# Patient Record
Sex: MALE | Race: WHITE | NOT HISPANIC OR LATINO | URBAN - METROPOLITAN AREA
[De-identification: names, ages, dates, MRNs, and addresses within clinical notes are randomized per-mention and may not be internally consistent; named-entity substitution may affect disease eponyms.]

---

## 2019-05-16 ENCOUNTER — INPATIENT (INPATIENT)
Facility: HOSPITAL | Age: 79
LOS: 6 days | Discharge: HOME | DRG: 68 | End: 2019-05-23
Attending: STUDENT IN AN ORGANIZED HEALTH CARE EDUCATION/TRAINING PROGRAM | Admitting: STUDENT IN AN ORGANIZED HEALTH CARE EDUCATION/TRAINING PROGRAM
Payer: MEDICAID

## 2019-05-16 VITALS
SYSTOLIC BLOOD PRESSURE: 191 MMHG | TEMPERATURE: 98 F | HEIGHT: 64 IN | HEART RATE: 70 BPM | OXYGEN SATURATION: 97 % | RESPIRATION RATE: 16 BRPM | DIASTOLIC BLOOD PRESSURE: 80 MMHG | WEIGHT: 184.97 LBS

## 2019-05-16 LAB
ALBUMIN SERPL ELPH-MCNC: 3.2 G/DL — LOW (ref 3.5–5)
ALP SERPL-CCNC: 77 U/L — SIGNIFICANT CHANGE UP (ref 40–120)
ALT FLD-CCNC: 38 U/L DA — SIGNIFICANT CHANGE UP (ref 10–60)
ANION GAP SERPL CALC-SCNC: 7 MMOL/L — SIGNIFICANT CHANGE UP (ref 5–17)
APAP SERPL-MCNC: <2 UG/ML — LOW (ref 10–30)
APTT BLD: 29.3 SEC — SIGNIFICANT CHANGE UP (ref 27.5–36.3)
AST SERPL-CCNC: 52 U/L — HIGH (ref 10–40)
BASOPHILS # BLD AUTO: 0.04 K/UL — SIGNIFICANT CHANGE UP (ref 0–0.2)
BASOPHILS NFR BLD AUTO: 0.4 % — SIGNIFICANT CHANGE UP (ref 0–2)
BILIRUB SERPL-MCNC: 0.4 MG/DL — SIGNIFICANT CHANGE UP (ref 0.2–1.2)
BUN SERPL-MCNC: 15 MG/DL — SIGNIFICANT CHANGE UP (ref 7–18)
CALCIUM SERPL-MCNC: 8.8 MG/DL — SIGNIFICANT CHANGE UP (ref 8.4–10.5)
CHLORIDE SERPL-SCNC: 109 MMOL/L — HIGH (ref 96–108)
CO2 SERPL-SCNC: 25 MMOL/L — SIGNIFICANT CHANGE UP (ref 22–31)
CREAT SERPL-MCNC: 1.14 MG/DL — SIGNIFICANT CHANGE UP (ref 0.5–1.3)
EOSINOPHIL # BLD AUTO: 0.22 K/UL — SIGNIFICANT CHANGE UP (ref 0–0.5)
EOSINOPHIL NFR BLD AUTO: 1.9 % — SIGNIFICANT CHANGE UP (ref 0–6)
ETHANOL SERPL-MCNC: <3 MG/DL — SIGNIFICANT CHANGE UP (ref 0–10)
GLUCOSE SERPL-MCNC: 129 MG/DL — HIGH (ref 70–99)
HCT VFR BLD CALC: 49.6 % — SIGNIFICANT CHANGE UP (ref 39–50)
HGB BLD-MCNC: 15.6 G/DL — SIGNIFICANT CHANGE UP (ref 13–17)
IMM GRANULOCYTES NFR BLD AUTO: 0.4 % — SIGNIFICANT CHANGE UP (ref 0–1.5)
INR BLD: 1.07 RATIO — SIGNIFICANT CHANGE UP (ref 0.88–1.16)
LYMPHOCYTES # BLD AUTO: 1.17 K/UL — SIGNIFICANT CHANGE UP (ref 1–3.3)
LYMPHOCYTES # BLD AUTO: 10.3 % — LOW (ref 13–44)
MCHC RBC-ENTMCNC: 27 PG — SIGNIFICANT CHANGE UP (ref 27–34)
MCHC RBC-ENTMCNC: 31.5 GM/DL — LOW (ref 32–36)
MCV RBC AUTO: 86 FL — SIGNIFICANT CHANGE UP (ref 80–100)
MONOCYTES # BLD AUTO: 0.7 K/UL — SIGNIFICANT CHANGE UP (ref 0–0.9)
MONOCYTES NFR BLD AUTO: 6.2 % — SIGNIFICANT CHANGE UP (ref 2–14)
NEUTROPHILS # BLD AUTO: 9.21 K/UL — HIGH (ref 1.8–7.4)
NEUTROPHILS NFR BLD AUTO: 80.8 % — HIGH (ref 43–77)
NRBC # BLD: 0 /100 WBCS — SIGNIFICANT CHANGE UP (ref 0–0)
PLATELET # BLD AUTO: 153 K/UL — SIGNIFICANT CHANGE UP (ref 150–400)
POTASSIUM SERPL-MCNC: 5.7 MMOL/L — HIGH (ref 3.5–5.3)
POTASSIUM SERPL-SCNC: 5.7 MMOL/L — HIGH (ref 3.5–5.3)
PROT SERPL-MCNC: 8.1 G/DL — SIGNIFICANT CHANGE UP (ref 6–8.3)
PROTHROM AB SERPL-ACNC: 11.9 SEC — SIGNIFICANT CHANGE UP (ref 10–12.9)
RBC # BLD: 5.77 M/UL — SIGNIFICANT CHANGE UP (ref 4.2–5.8)
RBC # FLD: 13.5 % — SIGNIFICANT CHANGE UP (ref 10.3–14.5)
SALICYLATES SERPL-MCNC: <1.7 MG/DL — LOW (ref 2.8–20)
SODIUM SERPL-SCNC: 141 MMOL/L — SIGNIFICANT CHANGE UP (ref 135–145)
TROPONIN I SERPL-MCNC: <0.015 NG/ML — SIGNIFICANT CHANGE UP (ref 0–0.04)
TSH SERPL-MCNC: 3.36 UU/ML — SIGNIFICANT CHANGE UP (ref 0.34–4.82)
WBC # BLD: 11.38 K/UL — HIGH (ref 3.8–10.5)
WBC # FLD AUTO: 11.38 K/UL — HIGH (ref 3.8–10.5)

## 2019-05-16 PROCEDURE — 73060 X-RAY EXAM OF HUMERUS: CPT | Mod: 26,RT

## 2019-05-16 PROCEDURE — 72170 X-RAY EXAM OF PELVIS: CPT | Mod: 26

## 2019-05-16 PROCEDURE — 73090 X-RAY EXAM OF FOREARM: CPT | Mod: 26,LT

## 2019-05-16 PROCEDURE — 73030 X-RAY EXAM OF SHOULDER: CPT | Mod: 26,RT

## 2019-05-16 PROCEDURE — 71045 X-RAY EXAM CHEST 1 VIEW: CPT | Mod: 26

## 2019-05-16 NOTE — ED PROVIDER NOTE - OBJECTIVE STATEMENT
79 M BIBEMS after fall on street.  Patient arrives with no identification, does not speak english and hard of hearing, unable to use  phone.  Staff member who spoke Armenian able to get patient's  and first name but patient unable to spell last name, cannot give further HPI of events but complaining of R arm pain.  Patient states takes medications for diabetes but does not know names.  Unclear if on blood thinners.

## 2019-05-16 NOTE — ED PROVIDER NOTE - CLINICAL SUMMARY MEDICAL DECISION MAKING FREE TEXT BOX
will check labs, CT scan xray, no apparent distress, will also check with [place] for possible missing person.

## 2019-05-16 NOTE — ED PROVIDER NOTE - PROGRESS NOTE DETAILS
Daughter now at bedside, takes medication for HTN and DM but no insulin, ct and xray negative for acute abrnomality. will admit for syncope vs. fall.

## 2019-05-17 DIAGNOSIS — Z29.9 ENCOUNTER FOR PROPHYLACTIC MEASURES, UNSPECIFIED: ICD-10-CM

## 2019-05-17 DIAGNOSIS — E87.5 HYPERKALEMIA: ICD-10-CM

## 2019-05-17 DIAGNOSIS — R55 SYNCOPE AND COLLAPSE: ICD-10-CM
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
## 2019-05-17 DIAGNOSIS — I10 ESSENTIAL (PRIMARY) HYPERTENSION: ICD-10-CM

## 2019-05-17 DIAGNOSIS — E11.9 TYPE 2 DIABETES MELLITUS WITHOUT COMPLICATIONS: ICD-10-CM

## 2019-05-17 LAB
ANION GAP SERPL CALC-SCNC: 6 MMOL/L — SIGNIFICANT CHANGE UP (ref 5–17)
APPEARANCE UR: CLEAR — SIGNIFICANT CHANGE UP
BASOPHILS # BLD AUTO: 0.03 K/UL — SIGNIFICANT CHANGE UP (ref 0–0.2)
BASOPHILS NFR BLD AUTO: 0.3 % — SIGNIFICANT CHANGE UP (ref 0–2)
BILIRUB UR-MCNC: NEGATIVE — SIGNIFICANT CHANGE UP
BUN SERPL-MCNC: 15 MG/DL — SIGNIFICANT CHANGE UP (ref 7–18)
CALCIUM SERPL-MCNC: 8.6 MG/DL — SIGNIFICANT CHANGE UP (ref 8.4–10.5)
CHLORIDE SERPL-SCNC: 108 MMOL/L — SIGNIFICANT CHANGE UP (ref 96–108)
CHOLEST SERPL-MCNC: 226 MG/DL — HIGH (ref 10–199)
CK MB BLD-MCNC: 1 % — SIGNIFICANT CHANGE UP (ref 0–3.5)
CK MB BLD-MCNC: 1.4 % — SIGNIFICANT CHANGE UP (ref 0–3.5)
CK MB CFR SERPL CALC: 1.2 NG/ML — SIGNIFICANT CHANGE UP (ref 0–3.6)
CK MB CFR SERPL CALC: 1.7 NG/ML — SIGNIFICANT CHANGE UP (ref 0–3.6)
CK SERPL-CCNC: 116 U/L — SIGNIFICANT CHANGE UP (ref 35–232)
CK SERPL-CCNC: 124 U/L — SIGNIFICANT CHANGE UP (ref 35–232)
CO2 SERPL-SCNC: 30 MMOL/L — SIGNIFICANT CHANGE UP (ref 22–31)
COLOR SPEC: YELLOW — SIGNIFICANT CHANGE UP
CREAT SERPL-MCNC: 1.16 MG/DL — SIGNIFICANT CHANGE UP (ref 0.5–1.3)
DIFF PNL FLD: NEGATIVE — SIGNIFICANT CHANGE UP
EOSINOPHIL # BLD AUTO: 0.39 K/UL — SIGNIFICANT CHANGE UP (ref 0–0.5)
EOSINOPHIL NFR BLD AUTO: 4.4 % — SIGNIFICANT CHANGE UP (ref 0–6)
GLUCOSE BLDC GLUCOMTR-MCNC: 114 MG/DL — HIGH (ref 70–99)
GLUCOSE BLDC GLUCOMTR-MCNC: 120 MG/DL — HIGH (ref 70–99)
GLUCOSE BLDC GLUCOMTR-MCNC: 130 MG/DL — HIGH (ref 70–99)
GLUCOSE BLDC GLUCOMTR-MCNC: 132 MG/DL — HIGH (ref 70–99)
GLUCOSE BLDC GLUCOMTR-MCNC: 134 MG/DL — HIGH (ref 70–99)
GLUCOSE SERPL-MCNC: 128 MG/DL — HIGH (ref 70–99)
GLUCOSE UR QL: NEGATIVE — SIGNIFICANT CHANGE UP
HBA1C BLD-MCNC: 8 % — HIGH (ref 4–5.6)
HCT VFR BLD CALC: 46.1 % — SIGNIFICANT CHANGE UP (ref 39–50)
HDLC SERPL-MCNC: 42 MG/DL — SIGNIFICANT CHANGE UP
HGB BLD-MCNC: 14.7 G/DL — SIGNIFICANT CHANGE UP (ref 13–17)
IMM GRANULOCYTES NFR BLD AUTO: 0.2 % — SIGNIFICANT CHANGE UP (ref 0–1.5)
KETONES UR-MCNC: NEGATIVE — SIGNIFICANT CHANGE UP
LEUKOCYTE ESTERASE UR-ACNC: NEGATIVE — SIGNIFICANT CHANGE UP
LIPID PNL WITH DIRECT LDL SERPL: 163 MG/DL — SIGNIFICANT CHANGE UP
LYMPHOCYTES # BLD AUTO: 2.48 K/UL — SIGNIFICANT CHANGE UP (ref 1–3.3)
LYMPHOCYTES # BLD AUTO: 27.7 % — SIGNIFICANT CHANGE UP (ref 13–44)
MAGNESIUM SERPL-MCNC: 2.3 MG/DL — SIGNIFICANT CHANGE UP (ref 1.6–2.6)
MCHC RBC-ENTMCNC: 27.8 PG — SIGNIFICANT CHANGE UP (ref 27–34)
MCHC RBC-ENTMCNC: 31.9 GM/DL — LOW (ref 32–36)
MCV RBC AUTO: 87.3 FL — SIGNIFICANT CHANGE UP (ref 80–100)
MONOCYTES # BLD AUTO: 0.79 K/UL — SIGNIFICANT CHANGE UP (ref 0–0.9)
MONOCYTES NFR BLD AUTO: 8.8 % — SIGNIFICANT CHANGE UP (ref 2–14)
NEUTROPHILS # BLD AUTO: 5.24 K/UL — SIGNIFICANT CHANGE UP (ref 1.8–7.4)
NEUTROPHILS NFR BLD AUTO: 58.6 % — SIGNIFICANT CHANGE UP (ref 43–77)
NITRITE UR-MCNC: NEGATIVE — SIGNIFICANT CHANGE UP
NRBC # BLD: 0 /100 WBCS — SIGNIFICANT CHANGE UP (ref 0–0)
PH UR: 6.5 — SIGNIFICANT CHANGE UP (ref 5–8)
PHOSPHATE SERPL-MCNC: 3.2 MG/DL — SIGNIFICANT CHANGE UP (ref 2.5–4.5)
PLATELET # BLD AUTO: 153 K/UL — SIGNIFICANT CHANGE UP (ref 150–400)
POTASSIUM SERPL-MCNC: 4.2 MMOL/L — SIGNIFICANT CHANGE UP (ref 3.5–5.3)
POTASSIUM SERPL-SCNC: 4.2 MMOL/L — SIGNIFICANT CHANGE UP (ref 3.5–5.3)
PROT UR-MCNC: NEGATIVE — SIGNIFICANT CHANGE UP
RBC # BLD: 5.28 M/UL — SIGNIFICANT CHANGE UP (ref 4.2–5.8)
RBC # FLD: 13.7 % — SIGNIFICANT CHANGE UP (ref 10.3–14.5)
SODIUM SERPL-SCNC: 144 MMOL/L — SIGNIFICANT CHANGE UP (ref 135–145)
SP GR SPEC: 1.01 — SIGNIFICANT CHANGE UP (ref 1.01–1.02)
TOTAL CHOLESTEROL/HDL RATIO MEASUREMENT: 5.4 RATIO — SIGNIFICANT CHANGE UP (ref 3.4–9.6)
TRIGL SERPL-MCNC: 106 MG/DL — SIGNIFICANT CHANGE UP (ref 10–149)
TROPONIN I SERPL-MCNC: <0.015 NG/ML — SIGNIFICANT CHANGE UP (ref 0–0.04)
TROPONIN I SERPL-MCNC: <0.015 NG/ML — SIGNIFICANT CHANGE UP (ref 0–0.04)
TSH SERPL-MCNC: 3.11 UU/ML — SIGNIFICANT CHANGE UP (ref 0.34–4.82)
UROBILINOGEN FLD QL: NEGATIVE — SIGNIFICANT CHANGE UP
VIT B12 SERPL-MCNC: 339 PG/ML — SIGNIFICANT CHANGE UP (ref 232–1245)
WBC # BLD: 8.95 K/UL — SIGNIFICANT CHANGE UP (ref 3.8–10.5)
WBC # FLD AUTO: 8.95 K/UL — SIGNIFICANT CHANGE UP (ref 3.8–10.5)

## 2019-05-17 PROCEDURE — 99285 EMERGENCY DEPT VISIT HI MDM: CPT

## 2019-05-17 PROCEDURE — 72125 CT NECK SPINE W/O DYE: CPT | Mod: 26

## 2019-05-17 PROCEDURE — 71045 X-RAY EXAM CHEST 1 VIEW: CPT | Mod: 26

## 2019-05-17 PROCEDURE — 70450 CT HEAD/BRAIN W/O DYE: CPT | Mod: 26

## 2019-05-17 PROCEDURE — 99223 1ST HOSP IP/OBS HIGH 75: CPT

## 2019-05-17 RX ORDER — METFORMIN HYDROCHLORIDE 850 MG/1
1 TABLET ORAL
Qty: 0 | Refills: 0 | DISCHARGE

## 2019-05-17 RX ORDER — DEXTROSE 50 % IN WATER 50 %
25 SYRINGE (ML) INTRAVENOUS ONCE
Refills: 0 | Status: DISCONTINUED | OUTPATIENT
Start: 2019-05-17 | End: 2019-05-17

## 2019-05-17 RX ORDER — LOSARTAN POTASSIUM 100 MG/1
1 TABLET, FILM COATED ORAL
Qty: 0 | Refills: 0 | DISCHARGE

## 2019-05-17 RX ORDER — SODIUM CHLORIDE 9 MG/ML
1000 INJECTION, SOLUTION INTRAVENOUS
Refills: 0 | Status: DISCONTINUED | OUTPATIENT
Start: 2019-05-17 | End: 2019-05-17

## 2019-05-17 RX ORDER — LOSARTAN POTASSIUM 100 MG/1
50 TABLET, FILM COATED ORAL DAILY
Refills: 0 | Status: DISCONTINUED | OUTPATIENT
Start: 2019-05-17 | End: 2019-05-23

## 2019-05-17 RX ORDER — ACETAMINOPHEN 500 MG
650 TABLET ORAL EVERY 6 HOURS
Refills: 0 | Status: DISCONTINUED | OUTPATIENT
Start: 2019-05-17 | End: 2019-05-23

## 2019-05-17 RX ORDER — GLUCAGON INJECTION, SOLUTION 0.5 MG/.1ML
1 INJECTION, SOLUTION SUBCUTANEOUS ONCE
Refills: 0 | Status: DISCONTINUED | OUTPATIENT
Start: 2019-05-17 | End: 2019-05-17

## 2019-05-17 RX ORDER — ENOXAPARIN SODIUM 100 MG/ML
40 INJECTION SUBCUTANEOUS DAILY
Refills: 0 | Status: DISCONTINUED | OUTPATIENT
Start: 2019-05-17 | End: 2019-05-23

## 2019-05-17 RX ORDER — ASPIRIN/CALCIUM CARB/MAGNESIUM 324 MG
81 TABLET ORAL DAILY
Refills: 0 | Status: DISCONTINUED | OUTPATIENT
Start: 2019-05-17 | End: 2019-05-23

## 2019-05-17 RX ORDER — DEXTROSE 50 % IN WATER 50 %
12.5 SYRINGE (ML) INTRAVENOUS ONCE
Refills: 0 | Status: DISCONTINUED | OUTPATIENT
Start: 2019-05-17 | End: 2019-05-17

## 2019-05-17 RX ORDER — INSULIN LISPRO 100/ML
VIAL (ML) SUBCUTANEOUS
Refills: 0 | Status: DISCONTINUED | OUTPATIENT
Start: 2019-05-17 | End: 2019-05-23

## 2019-05-17 RX ORDER — ATORVASTATIN CALCIUM 80 MG/1
40 TABLET, FILM COATED ORAL AT BEDTIME
Refills: 0 | Status: DISCONTINUED | OUTPATIENT
Start: 2019-05-17 | End: 2019-05-23

## 2019-05-17 RX ORDER — METOPROLOL TARTRATE 50 MG
25 TABLET ORAL
Refills: 0 | Status: DISCONTINUED | OUTPATIENT
Start: 2019-05-17 | End: 2019-05-23

## 2019-05-17 RX ORDER — DEXTROSE 50 % IN WATER 50 %
15 SYRINGE (ML) INTRAVENOUS ONCE
Refills: 0 | Status: DISCONTINUED | OUTPATIENT
Start: 2019-05-17 | End: 2019-05-17

## 2019-05-17 RX ADMIN — ATORVASTATIN CALCIUM 40 MILLIGRAM(S): 80 TABLET, FILM COATED ORAL at 22:30

## 2019-05-17 RX ADMIN — Medication 25 MILLIGRAM(S): at 18:15

## 2019-05-17 RX ADMIN — ENOXAPARIN SODIUM 40 MILLIGRAM(S): 100 INJECTION SUBCUTANEOUS at 12:17

## 2019-05-17 RX ADMIN — Medication 25 MILLIGRAM(S): at 06:20

## 2019-05-17 RX ADMIN — LOSARTAN POTASSIUM 50 MILLIGRAM(S): 100 TABLET, FILM COATED ORAL at 06:20

## 2019-05-17 RX ADMIN — Medication 81 MILLIGRAM(S): at 18:18

## 2019-05-17 NOTE — H&P ADULT - ATTENDING COMMENTS
79 year old man with PMH of Dementia who apparently wandered off while on his daily walk brought in on account of a fall -unwitnessed and c/o of right shoulder pain. HE was brought to the hospital by the PD where his family reconnected.  Of note, standing hx at home or intermittent dizziness /pre-syncope and has had to be caught before he fell multiple times per daughter. He has never been worked up for this.    Vital Signs Last 24 Hrs  T(C): 37.1 (17 May 2019 01:12), Max: 37.1 (17 May 2019 01:12)  T(F): 98.8 (17 May 2019 01:12), Max: 98.8 (17 May 2019 01:12)  HR: 66 (17 May 2019 01:12) (66 - 70)  BP: 157/71 (17 May 2019 01:12) (157/71 - 191/80)  RR: 16 (17 May 2019 01:12) (16 - 16)  SpO2: 97% (17 May 2019 01:12) (97% - 97%)    Elderly man, awake, [place] speaking with daughter interpreting, NAD AAO to self and place at least  No nystagmus  CTA B/L  RRR S1S2 only  Soft NT ND BS +  No pedal edema  No focal deficits    Labs                        15.6   11.38 )-----------( 153      ( 16 May 2019 22:00 )             49.6     05-16    141  |  109<H>  |  15  ----------------------------<  129<H>  5.7<H>   |  25  |  1.14    Ca    8.8      16 May 2019 22:00    TPro  8.1  /  Alb  3.2<L>  /  TBili  0.4  /  DBili  x   /  AST  52<H>  /  ALT  38  /  AlkPhos  77  05-16    CARDIAC MARKERS ( 16 May 2019 22:00 )  <0.015 ng/mL / x     / x     / x     / x        CT head/c-spine - no acute findings    Right shoulder/right humeral and forearm x rays  No acute fracture  periosteal elevation in humeral x ray. Await final report.    Impression  - Mechanical fall vs syncope   - Mild hyperkalemia     No evidence of fracture as above  Would r/o CV ( and neuro) causes of possible syncope  ADMIT to tele  Serial trop  EKG  ECHO  Carotid doppler scan  Monitor BP  Fall precaution  Social work consult - daughter wants more [facility] hours  Repeat chemistry after correcting K+

## 2019-05-17 NOTE — H&P ADULT - ASSESSMENT
79 years old male who walks with cane, lives with daughter, PMHX of DM, HTN was brought in my EMS after he fell on street. Normally, he walks with cane per daughter but went out without it and fell. Patient has multiple falls recently. Pt is complaining of pain in Right arm since fall. Pain is 7/10 in intensity, located in right arm, aggravated y movement. Patient is a poor historian and does not elaborate the circumstances under which he fell. It was an unwitnessed fall.

## 2019-05-17 NOTE — H&P ADULT - PROBLEM SELECTOR PLAN 2
Pt presented with S/P fall which was unwitnessed.  CT head and cervical spine is negative for any acute changes.  Pt complains of right arm pain.  X-rays are negative for any acute findings.  pain management.  PT consult.  Fall precautions.

## 2019-05-17 NOTE — H&P ADULT - NSHPPHYSICALEXAM_GEN_ALL_CORE
Vital Signs Last 24 Hrs  T(C): 36.3 (17 May 2019 05:22), Max: 37.1 (17 May 2019 01:12)  T(F): 97.4 (17 May 2019 05:22), Max: 98.8 (17 May 2019 01:12)  HR: 75 (17 May 2019 05:22) (66 - 75)  BP: 154/62 (17 May 2019 05:22) (154/62 - 191/80)  BP(mean): --  RR: 18 (17 May 2019 05:22) (16 - 18)  SpO2: 91% (17 May 2019 05:22) (91% - 97%)

## 2019-05-17 NOTE — ED ADULT NURSE REASSESSMENT NOTE - NS ED NURSE REASSESS COMMENT FT1
Pt reassessed, observed laying in bed, breathing room air, in no respiratory distress at time of assessment. Pt is A&O x2-3, able to make needs known in [place]an, denies any distress/discomfort at this time. Daughter & son at bedside. No meds administered at this time. Skin intact, right hand #20Ga in place, pt ambulates with assistance. Admitted to [facility], on wall monitor, sinus rhythm noted, HR 78 at this time. Report given to LINDA Frances for [address] [place] [address]. Pt transported on stretcher and monitor to floor alongside family.

## 2019-05-17 NOTE — H&P ADULT - PROBLEM SELECTOR PLAN 1
pt presented with s/p fall.  admitted with R/O syncope cardiogenic vs neurological causes.  Admitted to telemetry.  T1 is neg, F/U T2 and T3.  f/u Echo  f /u carotid dopplar.  CT head is negative for any acute change.

## 2019-05-17 NOTE — ED ADULT NURSE NOTE - OBJECTIVE STATEMENT
BIBA, pt fell on the street. Per EMS it was witnessed. On arrival, pt appears lethargic, not able to tell his name or environment.

## 2019-05-17 NOTE — ED ADULT NURSE NOTE - NSIMPLEMENTINTERV_GEN_ALL_ED
Implemented All Fall with Harm Risk Interventions:  [place] to call system. Call bell, personal items and telephone within reach. Instruct patient to call for assistance. Room bathroom lighting operational. Non-slip footwear when patient is off stretcher. Physically safe environment: no spills, clutter or unnecessary equipment. Stretcher in lowest position, wheels locked, appropriate side rails in place. Provide visual cue, wrist band, yellow gown, etc. Monitor gait and stability. Monitor for mental status changes and reorient to person, place, and time. Review medications for side effects contributing to fall risk. Reinforce activity limits and safety measures with patient and family. Provide visual clues: red socks.

## 2019-05-17 NOTE — H&P ADULT - HISTORY OF PRESENT ILLNESS
79 years old male who walks with cane, lives with daughter, PMHX of DM, HTN was brought in my EMS after he fell on street. Normally, he walks with cane per daughter but went out without it and fell. Patient has multiple falls recently. Pt is complaining of pain in Right arm since fall. Pain is 7/10 in intensity, located in right arm, aggravated y movement. Patient is a poor historian and does not elaborate the circumstances under which he fell. It was an unwitnessed fall.   pt denies any chest pain, palpitations, shortness of breath, nausea, vomiting, abdominal pain or any other symptom.

## 2019-05-17 NOTE — H&P ADULT - PROBLEM SELECTOR PLAN 3
pt has mild hyperkalemia on presentation with serum potassium of 5.7.  Not on any diuretic, kidney function is grossly normal.  F/u BMP in am

## 2019-05-17 NOTE — H&P ADULT - PROBLEM SELECTOR PLAN 5
Pt has history of DM for which he takes metformin 500 mg twice daily at home.  insulin sliding scale while in house.  F/U Hba1c

## 2019-05-17 NOTE — H&P ADULT - PROBLEM SELECTOR PLAN 4
pt has history of hypertension.  Blood pressure was elevated on presentation.  resumed Home dose of losartan and started metoprolol per ACS protocol.  monitor BP.  DASH tlc diet

## 2019-05-18 LAB
ANION GAP SERPL CALC-SCNC: 6 MMOL/L — SIGNIFICANT CHANGE UP (ref 5–17)
BASOPHILS # BLD AUTO: 0.04 K/UL — SIGNIFICANT CHANGE UP (ref 0–0.2)
BASOPHILS NFR BLD AUTO: 0.5 % — SIGNIFICANT CHANGE UP (ref 0–2)
BUN SERPL-MCNC: 26 MG/DL — HIGH (ref 7–18)
CALCIUM SERPL-MCNC: 8.9 MG/DL — SIGNIFICANT CHANGE UP (ref 8.4–10.5)
CHLORIDE SERPL-SCNC: 109 MMOL/L — HIGH (ref 96–108)
CO2 SERPL-SCNC: 30 MMOL/L — SIGNIFICANT CHANGE UP (ref 22–31)
CREAT SERPL-MCNC: 1.16 MG/DL — SIGNIFICANT CHANGE UP (ref 0.5–1.3)
CULTURE RESULTS: SIGNIFICANT CHANGE UP
EOSINOPHIL # BLD AUTO: 0.62 K/UL — HIGH (ref 0–0.5)
EOSINOPHIL NFR BLD AUTO: 8.1 % — HIGH (ref 0–6)
GLUCOSE BLDC GLUCOMTR-MCNC: 106 MG/DL — HIGH (ref 70–99)
GLUCOSE BLDC GLUCOMTR-MCNC: 139 MG/DL — HIGH (ref 70–99)
GLUCOSE BLDC GLUCOMTR-MCNC: 149 MG/DL — HIGH (ref 70–99)
GLUCOSE BLDC GLUCOMTR-MCNC: 168 MG/DL — HIGH (ref 70–99)
GLUCOSE SERPL-MCNC: 133 MG/DL — HIGH (ref 70–99)
HCT VFR BLD CALC: 49.8 % — SIGNIFICANT CHANGE UP (ref 39–50)
HGB BLD-MCNC: 15.8 G/DL — SIGNIFICANT CHANGE UP (ref 13–17)
IMM GRANULOCYTES NFR BLD AUTO: 0.3 % — SIGNIFICANT CHANGE UP (ref 0–1.5)
LYMPHOCYTES # BLD AUTO: 2.21 K/UL — SIGNIFICANT CHANGE UP (ref 1–3.3)
LYMPHOCYTES # BLD AUTO: 29 % — SIGNIFICANT CHANGE UP (ref 13–44)
MCHC RBC-ENTMCNC: 27.2 PG — SIGNIFICANT CHANGE UP (ref 27–34)
MCHC RBC-ENTMCNC: 31.7 GM/DL — LOW (ref 32–36)
MCV RBC AUTO: 85.9 FL — SIGNIFICANT CHANGE UP (ref 80–100)
MONOCYTES # BLD AUTO: 0.48 K/UL — SIGNIFICANT CHANGE UP (ref 0–0.9)
MONOCYTES NFR BLD AUTO: 6.3 % — SIGNIFICANT CHANGE UP (ref 2–14)
NEUTROPHILS # BLD AUTO: 4.25 K/UL — SIGNIFICANT CHANGE UP (ref 1.8–7.4)
NEUTROPHILS NFR BLD AUTO: 55.8 % — SIGNIFICANT CHANGE UP (ref 43–77)
NRBC # BLD: 0 /100 WBCS — SIGNIFICANT CHANGE UP (ref 0–0)
PLATELET # BLD AUTO: 167 K/UL — SIGNIFICANT CHANGE UP (ref 150–400)
POTASSIUM SERPL-MCNC: 4.4 MMOL/L — SIGNIFICANT CHANGE UP (ref 3.5–5.3)
POTASSIUM SERPL-SCNC: 4.4 MMOL/L — SIGNIFICANT CHANGE UP (ref 3.5–5.3)
RBC # BLD: 5.8 M/UL — SIGNIFICANT CHANGE UP (ref 4.2–5.8)
RBC # FLD: 13.9 % — SIGNIFICANT CHANGE UP (ref 10.3–14.5)
SODIUM SERPL-SCNC: 145 MMOL/L — SIGNIFICANT CHANGE UP (ref 135–145)
SPECIMEN SOURCE: SIGNIFICANT CHANGE UP
WBC # BLD: 7.62 K/UL — SIGNIFICANT CHANGE UP (ref 3.8–10.5)
WBC # FLD AUTO: 7.62 K/UL — SIGNIFICANT CHANGE UP (ref 3.8–10.5)

## 2019-05-18 PROCEDURE — 99233 SBSQ HOSP IP/OBS HIGH 50: CPT

## 2019-05-18 RX ADMIN — LOSARTAN POTASSIUM 50 MILLIGRAM(S): 100 TABLET, FILM COATED ORAL at 06:39

## 2019-05-18 RX ADMIN — Medication 25 MILLIGRAM(S): at 06:39

## 2019-05-18 RX ADMIN — Medication 25 MILLIGRAM(S): at 17:30

## 2019-05-18 RX ADMIN — Medication 81 MILLIGRAM(S): at 13:16

## 2019-05-18 RX ADMIN — Medication 1: at 12:15

## 2019-05-18 RX ADMIN — ENOXAPARIN SODIUM 40 MILLIGRAM(S): 100 INJECTION SUBCUTANEOUS at 13:11

## 2019-05-18 RX ADMIN — ATORVASTATIN CALCIUM 40 MILLIGRAM(S): 80 TABLET, FILM COATED ORAL at 22:17

## 2019-05-18 NOTE — PHYSICAL THERAPY INITIAL EVALUATION ADULT - LIVES WITH, PROFILE
Unable to obtain living situation info from patient. Tried to use [place]  but patient [place]. Called daughter and went to voicemail

## 2019-05-18 NOTE — PHYSICAL THERAPY INITIAL EVALUATION ADULT - ADDITIONAL COMMENTS
Unable to obtain living situation info from patient. Tried to use [place]  but patient [place]. Called daughter and went to voicemail. As per patient H&P, patient lives with daughter and walks with cane.

## 2019-05-18 NOTE — PROGRESS NOTE ADULT - PROBLEM SELECTOR PLAN 1
s/p unwitnessed fall  tele monitoring  cardiac enyzmes negative  f/u carotid doppler  echo shows pEF with G1DD and mod pulm HTN  PT following  social work following as family wants extended home care hours s/p unwitnessed fall  tele monitoring  cardiac enyzmes negative  f/u carotid doppler  echo shows pEF with G1DD and mod pulm HTN  PT following: home with PT  social work following as family wants extended home care hours

## 2019-05-18 NOTE — PHYSICAL THERAPY INITIAL EVALUATION ADULT - DIAGNOSIS, PT EVAL
Patient presents with slightly impaired balance during ambulation, secondary to fatigue and BLE general weakness.

## 2019-05-18 NOTE — PROGRESS NOTE ADULT - SUBJECTIVE AND OBJECTIVE BOX
PGY1 Note discussed with supervising resident and primary attending.    Patient is a 79y old  Male who presents with a chief complaint of s/p fall (17 May 2019 04:27)      INTERVAL HPI/OVERNIGHT EVENTS:    MEDICATIONS  (STANDING):  aspirin  chewable 81 milliGRAM(s) Oral daily  atorvastatin 40 milliGRAM(s) Oral at bedtime  enoxaparin Injectable 40 milliGRAM(s) SubCutaneous daily  insulin lispro (HumaLOG) corrective regimen sliding scale   SubCutaneous Before meals and at bedtime  losartan 50 milliGRAM(s) Oral daily  metoprolol tartrate 25 milliGRAM(s) Oral two times a day    MEDICATIONS  (PRN):  acetaminophen   Tablet .. 650 milliGRAM(s) Oral every 6 hours PRN Temp greater or equal to 38C (100.4F), Mild Pain (1 - 3), Moderate Pain (4 - 6)      Allergies    Allergy Status Unknown    Intolerances        REVIEW OF SYSTEMS:  CONSTITUTIONAL: No fever, weight loss, or fatigue  RESPIRATORY: No cough, wheezing, chills or hemoptysis; No shortness of breath  CARDIOVASCULAR: No chest pain, palpitations, dizziness, or leg swelling  GASTROINTESTINAL: No abdominal or epigastric pain. No nausea, vomiting, or hematemesis; No diarrhea or constipation. No melena or hematochezia.  NEUROLOGICAL: No headaches, memory loss, loss of strength, numbness, or tremors  SKIN: No itching, burning, rashes, or lesions     Vital Signs Last 24 Hrs  T(C): 36.4 (18 May 2019 11:11), Max: 37.1 (17 May 2019 19:09)  T(F): 97.5 (18 May 2019 11:11), Max: 98.7 (17 May 2019 19:09)  HR: 58 (18 May 2019 11:11) (56 - 76)  BP: 145/58 (18 May 2019 11:11) (132/58 - 150/60)  BP(mean): --  RR: 18 (18 May 2019 11:11) (18 - 18)  SpO2: 95% (18 May 2019 11:11) (93% - 97%)    PHYSICAL EXAM:  GENERAL: NAD  HEAD:  Atraumatic, Normocephalic  EYES: EOMI, PERRLA, conjunctiva and sclera clear  NECK: Supple, No JVD, Normal thyroid  CHEST/LUNG: Clear to percussion bilaterally; No rales, rhonchi, wheezing, or rubs  HEART: Regular rate and rhythm; No murmurs, rubs, or gallops  ABDOMEN: Soft, Nontender, Nondistended; Bowel sounds present  NERVOUS SYSTEM:  Good concentration  EXTREMITIES:  2+ Peripheral Pulses, No clubbing, cyanosis, or edema    LABS:                        15.8   7.62  )-----------( 167      ( 18 May 2019 06:58 )             49.8     05-18    145  |  109<H>  |  26<H>  ----------------------------<  133<H>  4.4   |  30  |  1.16    Ca    8.9      18 May 2019 06:58  Phos  3.2     05-  Mg     2.3     -    TPro  8.1  /  Alb  3.2<L>  /  TBili  0.4  /  DBili  x   /  AST  52<H>  /  ALT  38  /  AlkPhos  77  05-16    PT/INR - ( 16 May 2019 22:00 )   PT: 11.9 sec;   INR: 1.07 ratio         PTT - ( 16 May 2019 22:00 )  PTT:29.3 sec  Urinalysis Basic - ( 17 May 2019 12:36 )    Color: Yellow / Appearance: Clear / S.015 / pH: x  Gluc: x / Ketone: Negative  / Bili: Negative / Urobili: Negative   Blood: x / Protein: Negative / Nitrite: Negative   Leuk Esterase: Negative / RBC: x / WBC x   Sq Epi: x / Non Sq Epi: x / Bacteria: x      CAPILLARY BLOOD GLUCOSE      POCT Blood Glucose.: 139 mg/dL (18 May 2019 08:08)  POCT Blood Glucose.: 114 mg/dL (17 May 2019 22:10)  POCT Blood Glucose.: 120 mg/dL (17 May 2019 21:48)  POCT Blood Glucose.: 132 mg/dL (17 May 2019 17:04)  POCT Blood Glucose.: 130 mg/dL (17 May 2019 12:02)      RADIOLOGY & ADDITIONAL TESTS:    Imaging Personally Reviewed:  [X ] YES  [ ] NO    Consultant(s) Notes Reviewed:  [X ] YES  [ ] NO

## 2019-05-19 LAB
ANION GAP SERPL CALC-SCNC: 5 MMOL/L — SIGNIFICANT CHANGE UP (ref 5–17)
BASOPHILS # BLD AUTO: 0.04 K/UL — SIGNIFICANT CHANGE UP (ref 0–0.2)
BASOPHILS NFR BLD AUTO: 0.6 % — SIGNIFICANT CHANGE UP (ref 0–2)
BUN SERPL-MCNC: 28 MG/DL — HIGH (ref 7–18)
CALCIUM SERPL-MCNC: 8.7 MG/DL — SIGNIFICANT CHANGE UP (ref 8.4–10.5)
CHLORIDE SERPL-SCNC: 105 MMOL/L — SIGNIFICANT CHANGE UP (ref 96–108)
CO2 SERPL-SCNC: 31 MMOL/L — SIGNIFICANT CHANGE UP (ref 22–31)
CREAT SERPL-MCNC: 1.2 MG/DL — SIGNIFICANT CHANGE UP (ref 0.5–1.3)
EOSINOPHIL # BLD AUTO: 0.71 K/UL — HIGH (ref 0–0.5)
EOSINOPHIL NFR BLD AUTO: 10.2 % — HIGH (ref 0–6)
GLUCOSE BLDC GLUCOMTR-MCNC: 206 MG/DL — HIGH (ref 70–99)
GLUCOSE BLDC GLUCOMTR-MCNC: 82 MG/DL — SIGNIFICANT CHANGE UP (ref 70–99)
GLUCOSE BLDC GLUCOMTR-MCNC: 97 MG/DL — SIGNIFICANT CHANGE UP (ref 70–99)
GLUCOSE BLDC GLUCOMTR-MCNC: 97 MG/DL — SIGNIFICANT CHANGE UP (ref 70–99)
GLUCOSE SERPL-MCNC: 104 MG/DL — HIGH (ref 70–99)
HCT VFR BLD CALC: 47.7 % — SIGNIFICANT CHANGE UP (ref 39–50)
HGB BLD-MCNC: 14.9 G/DL — SIGNIFICANT CHANGE UP (ref 13–17)
IMM GRANULOCYTES NFR BLD AUTO: 0.3 % — SIGNIFICANT CHANGE UP (ref 0–1.5)
LYMPHOCYTES # BLD AUTO: 2.23 K/UL — SIGNIFICANT CHANGE UP (ref 1–3.3)
LYMPHOCYTES # BLD AUTO: 31.9 % — SIGNIFICANT CHANGE UP (ref 13–44)
MCHC RBC-ENTMCNC: 27.1 PG — SIGNIFICANT CHANGE UP (ref 27–34)
MCHC RBC-ENTMCNC: 31.2 GM/DL — LOW (ref 32–36)
MCV RBC AUTO: 86.9 FL — SIGNIFICANT CHANGE UP (ref 80–100)
MONOCYTES # BLD AUTO: 0.54 K/UL — SIGNIFICANT CHANGE UP (ref 0–0.9)
MONOCYTES NFR BLD AUTO: 7.7 % — SIGNIFICANT CHANGE UP (ref 2–14)
NEUTROPHILS # BLD AUTO: 3.45 K/UL — SIGNIFICANT CHANGE UP (ref 1.8–7.4)
NEUTROPHILS NFR BLD AUTO: 49.3 % — SIGNIFICANT CHANGE UP (ref 43–77)
NRBC # BLD: 0 /100 WBCS — SIGNIFICANT CHANGE UP (ref 0–0)
PLATELET # BLD AUTO: 146 K/UL — LOW (ref 150–400)
POTASSIUM SERPL-MCNC: 4.1 MMOL/L — SIGNIFICANT CHANGE UP (ref 3.5–5.3)
POTASSIUM SERPL-SCNC: 4.1 MMOL/L — SIGNIFICANT CHANGE UP (ref 3.5–5.3)
RBC # BLD: 5.49 M/UL — SIGNIFICANT CHANGE UP (ref 4.2–5.8)
RBC # FLD: 13.6 % — SIGNIFICANT CHANGE UP (ref 10.3–14.5)
SODIUM SERPL-SCNC: 141 MMOL/L — SIGNIFICANT CHANGE UP (ref 135–145)
WBC # BLD: 6.99 K/UL — SIGNIFICANT CHANGE UP (ref 3.8–10.5)
WBC # FLD AUTO: 6.99 K/UL — SIGNIFICANT CHANGE UP (ref 3.8–10.5)

## 2019-05-19 PROCEDURE — 99232 SBSQ HOSP IP/OBS MODERATE 35: CPT

## 2019-05-19 RX ADMIN — ATORVASTATIN CALCIUM 40 MILLIGRAM(S): 80 TABLET, FILM COATED ORAL at 22:02

## 2019-05-19 RX ADMIN — Medication 81 MILLIGRAM(S): at 12:18

## 2019-05-19 RX ADMIN — Medication 2: at 12:18

## 2019-05-19 RX ADMIN — Medication 25 MILLIGRAM(S): at 17:18

## 2019-05-19 RX ADMIN — ENOXAPARIN SODIUM 40 MILLIGRAM(S): 100 INJECTION SUBCUTANEOUS at 12:18

## 2019-05-19 NOTE — PROGRESS NOTE ADULT - SUBJECTIVE AND OBJECTIVE BOX
Patient is a 79y old  Male who presents with a chief complaint of s/p fall (18 May 2019 11:12)      HPI:  79 years old male who walks with cane, lives with daughter, PMHX of DM, HTN was brought in my EMS after he fell on street. Normally, he walks with cane per daughter but went out without it and fell. Patient has multiple falls recently. Pt is complaining of pain in Right arm since fall. Pain is 7/10 in intensity, located in right arm, aggravated y movement. Patient is a poor historian and does not elaborate the circumstances under which he fell. It was an unwitnessed fall.   pt denies any chest pain, palpitations, shortness of breath, nausea, vomiting, abdominal pain or any other symptom. (17 May 2019 04:27)    OPPQQRST    REVIEW OF SYSTEMS  General: no lethargy	  Skin/Breast: no rash  Ophthalmologic: no blurry vision  ENMT:	no sore throat, no ear pain  Respiratory and Thorax:	no sob, no cough, wheezing  Cardiovascular:	no chest pain, no palpitations, no lower extremity swelling  Gastrointestinal:	no nausea, no vomiting,   Genitourinary: no dysuria, no frequency	  Musculoskeletal:	 no muscle pain  Neurological: no weakness  Psychiatric: no anxiety	  Hematology/Lymphatics:	 no bruising  Endocrine: no increased thirst, no change in appetite  PAST MEDICAL & SURGICAL HISTORY:  Diabetes  Hypertension    MEDICATIONS  (STANDING):  aspirin  chewable 81 milliGRAM(s) Oral daily  atorvastatin 40 milliGRAM(s) Oral at bedtime  enoxaparin Injectable 40 milliGRAM(s) SubCutaneous daily  insulin lispro (HumaLOG) corrective regimen sliding scale   SubCutaneous Before meals and at bedtime  losartan 50 milliGRAM(s) Oral daily  metoprolol tartrate 25 milliGRAM(s) Oral two times a day    MEDICATIONS  (PRN):  acetaminophen   Tablet .. 650 milliGRAM(s) Oral every 6 hours PRN Temp greater or equal to 38C (100.4F), Mild Pain (1 - 3), Moderate Pain (4 - 6)      Social History:  etoh  tobacco   drug use    EXAM:  Vital Signs Last 24 Hrs  T(C): 37 (19 May 2019 15:37), Max: 37 (19 May 2019 15:37)  T(F): 98.6 (19 May 2019 15:37), Max: 98.6 (19 May 2019 15:37)  HR: 69 (19 May 2019 15:37) (53 - 69)  BP: 137/58 (19 May 2019 15:37) (134/66 - 154/51)  BP(mean): --  RR: 18 (19 May 2019 15:37) (18 - 18)  SpO2: 96% (19 May 2019 15:37) (94% - 96%)      PHYSICAL EXAM:  Constitutional: awake sleeping in stretcher chair.   Eyes: eomi, perrla.  ENMT: patent nares, moist mucus membranes  Neck: supple  Breasts: deferred  Back: non tender. no scoliosis.   Respiratory: bilaterally clear to auscultation, no wheezing, no rhonchi, no crackles, no decreased air entry  Cardiovascular: s1s2, rrr, no murmurs.   Gastrointestinal: soft, non tender, +bowel sounds, no rebound, no guarding.   Genitourinary: deferred  Rectal: deferred   Extremities: no edema.   Vascular:   Neurological: alert and oriented to person, date and place.   Skin: intact no rash, warm to touch.   Lymph Nodes:  Musculoskeletal: moves all 4 extremities  Psychiatric: no homicidal, suicidal ideation. appropriate affect.   Heme/Lymph:   LABS:                              14.9   6.99  )-----------( 146      ( 19 May 2019 06:21 )             47.7     05-19    141  |  105  |  28<H>  ----------------------------<  104<H>  4.1   |  31  |  1.20    Ca    8.7      19 May 2019 06:21              Chart reviewed.   Labs reviewed.  Imaging reviewed.   Plan discussed with consultants. Patient is a 79y old  Male who presents with a chief complaint of s/p fall (18 May 2019 11:12)    HPI:  79 years old male who walks with cane, lives with daughter, PMHX of DM, HTN was brought in my EMS after he fell on street. Normally, he walks with cane per daughter but went out without it and fell. Patient has multiple falls recently. Pt is complaining of pain in Right arm since fall. Pain is 7/10 in intensity, located in right arm, aggravated y movement. Patient is a poor historian and does not elaborate the circumstances under which he fell. It was an unwitnessed fall.   pt denies any chest pain, palpitations, shortness of breath, nausea, vomiting, abdominal pain or any other symptom. (17 May 2019 04:27)    Today:   No events as per staff.   Pt reading, waves his hand away on being examined.     PAST MEDICAL & SURGICAL HISTORY:  Diabetes  Hypertension    MEDICATIONS  (STANDING):  aspirin  chewable 81 milliGRAM(s) Oral daily  atorvastatin 40 milliGRAM(s) Oral at bedtime  enoxaparin Injectable 40 milliGRAM(s) SubCutaneous daily  insulin lispro (HumaLOG) corrective regimen sliding scale   SubCutaneous Before meals and at bedtime  losartan 50 milliGRAM(s) Oral daily  metoprolol tartrate 25 milliGRAM(s) Oral two times a day    MEDICATIONS  (PRN):  acetaminophen   Tablet .. 650 milliGRAM(s) Oral every 6 hours PRN Temp greater or equal to 38C (100.4F), Mild Pain (1 - 3), Moderate Pain (4 - 6)    EXAM:  Vital Signs Last 24 Hrs  T(C): 37 (19 May 2019 15:37), Max: 37 (19 May 2019 15:37)  T(F): 98.6 (19 May 2019 15:37), Max: 98.6 (19 May 2019 15:37)  HR: 69 (19 May 2019 15:37) (53 - 69)  BP: 137/58 (19 May 2019 15:37) (134/66 - 154/51)  BP(mean): --  RR: 18 (19 May 2019 15:37) (18 - 18)  SpO2: 96% (19 May 2019 15:37) (94% - 96%)    PHYSICAL EXAM:  Constitutional: awake sitting in bed reading. Pt does not want to be examined. Pt waving his hand when I attempt to interact with him.   Psychiatric: calm affect.     LABS:                      14.9   6.99  )-----------( 146      ( 19 May 2019 06:21 )             47.7     05-19  141  |  105  |  28<H>  ----------------------------<  104<H>  4.1   |  31  |  1.20  Ca    8.7      19 May 2019 06:21    Chart reviewed.   Labs reviewed.  Imaging reviewed.   Plan discussed with consultants.

## 2019-05-20 LAB
ANION GAP SERPL CALC-SCNC: 10 MMOL/L — SIGNIFICANT CHANGE UP (ref 5–17)
BASOPHILS # BLD AUTO: 0.04 K/UL — SIGNIFICANT CHANGE UP (ref 0–0.2)
BASOPHILS NFR BLD AUTO: 0.6 % — SIGNIFICANT CHANGE UP (ref 0–2)
BUN SERPL-MCNC: 24 MG/DL — HIGH (ref 7–18)
CALCIUM SERPL-MCNC: 8.7 MG/DL — SIGNIFICANT CHANGE UP (ref 8.4–10.5)
CHLORIDE SERPL-SCNC: 104 MMOL/L — SIGNIFICANT CHANGE UP (ref 96–108)
CO2 SERPL-SCNC: 27 MMOL/L — SIGNIFICANT CHANGE UP (ref 22–31)
CREAT SERPL-MCNC: 1.01 MG/DL — SIGNIFICANT CHANGE UP (ref 0.5–1.3)
EOSINOPHIL # BLD AUTO: 0.76 K/UL — HIGH (ref 0–0.5)
EOSINOPHIL NFR BLD AUTO: 10.7 % — HIGH (ref 0–6)
GLUCOSE BLDC GLUCOMTR-MCNC: 150 MG/DL — HIGH (ref 70–99)
GLUCOSE BLDC GLUCOMTR-MCNC: 157 MG/DL — HIGH (ref 70–99)
GLUCOSE BLDC GLUCOMTR-MCNC: 170 MG/DL — HIGH (ref 70–99)
GLUCOSE BLDC GLUCOMTR-MCNC: 177 MG/DL — HIGH (ref 70–99)
GLUCOSE SERPL-MCNC: 100 MG/DL — HIGH (ref 70–99)
HCT VFR BLD CALC: 49 % — SIGNIFICANT CHANGE UP (ref 39–50)
HGB BLD-MCNC: 15.6 G/DL — SIGNIFICANT CHANGE UP (ref 13–17)
IMM GRANULOCYTES NFR BLD AUTO: 0.3 % — SIGNIFICANT CHANGE UP (ref 0–1.5)
LYMPHOCYTES # BLD AUTO: 2.16 K/UL — SIGNIFICANT CHANGE UP (ref 1–3.3)
LYMPHOCYTES # BLD AUTO: 30.5 % — SIGNIFICANT CHANGE UP (ref 13–44)
MCHC RBC-ENTMCNC: 27.1 PG — SIGNIFICANT CHANGE UP (ref 27–34)
MCHC RBC-ENTMCNC: 31.8 GM/DL — LOW (ref 32–36)
MCV RBC AUTO: 85.1 FL — SIGNIFICANT CHANGE UP (ref 80–100)
MONOCYTES # BLD AUTO: 0.52 K/UL — SIGNIFICANT CHANGE UP (ref 0–0.9)
MONOCYTES NFR BLD AUTO: 7.3 % — SIGNIFICANT CHANGE UP (ref 2–14)
NEUTROPHILS # BLD AUTO: 3.58 K/UL — SIGNIFICANT CHANGE UP (ref 1.8–7.4)
NEUTROPHILS NFR BLD AUTO: 50.6 % — SIGNIFICANT CHANGE UP (ref 43–77)
NRBC # BLD: 0 /100 WBCS — SIGNIFICANT CHANGE UP (ref 0–0)
PLATELET # BLD AUTO: 162 K/UL — SIGNIFICANT CHANGE UP (ref 150–400)
POTASSIUM SERPL-MCNC: 3.7 MMOL/L — SIGNIFICANT CHANGE UP (ref 3.5–5.3)
POTASSIUM SERPL-SCNC: 3.7 MMOL/L — SIGNIFICANT CHANGE UP (ref 3.5–5.3)
RBC # BLD: 5.76 M/UL — SIGNIFICANT CHANGE UP (ref 4.2–5.8)
RBC # FLD: 13.5 % — SIGNIFICANT CHANGE UP (ref 10.3–14.5)
SODIUM SERPL-SCNC: 141 MMOL/L — SIGNIFICANT CHANGE UP (ref 135–145)
WBC # BLD: 7.08 K/UL — SIGNIFICANT CHANGE UP (ref 3.8–10.5)
WBC # FLD AUTO: 7.08 K/UL — SIGNIFICANT CHANGE UP (ref 3.8–10.5)

## 2019-05-20 PROCEDURE — 93880 EXTRACRANIAL BILAT STUDY: CPT | Mod: 26

## 2019-05-20 PROCEDURE — 99233 SBSQ HOSP IP/OBS HIGH 50: CPT | Mod: GC

## 2019-05-20 RX ADMIN — Medication 25 MILLIGRAM(S): at 18:01

## 2019-05-20 RX ADMIN — Medication 25 MILLIGRAM(S): at 05:32

## 2019-05-20 RX ADMIN — LOSARTAN POTASSIUM 50 MILLIGRAM(S): 100 TABLET, FILM COATED ORAL at 05:32

## 2019-05-20 RX ADMIN — ATORVASTATIN CALCIUM 40 MILLIGRAM(S): 80 TABLET, FILM COATED ORAL at 21:49

## 2019-05-20 RX ADMIN — Medication 1: at 12:33

## 2019-05-20 RX ADMIN — Medication 81 MILLIGRAM(S): at 12:38

## 2019-05-20 RX ADMIN — ENOXAPARIN SODIUM 40 MILLIGRAM(S): 100 INJECTION SUBCUTANEOUS at 15:19

## 2019-05-20 NOTE — CONSULT NOTE ADULT - ATTENDING COMMENTS
Seen ex'ed dw'ed PA  Adm'ed w global symps  No focal neuro symps reported  ?Baseline MS  Understands [place] and responds w gestures (?baseline)  No other periph vasc probs reported    Car US reviewed:   L ICA mod stenosis to my eye (244/47, ratio 3)    A/P:   Carotid stenosis  ?neuro status    Rec:   CTA neck and brain to clarify anatomy/degree of stenosis  Neuro eval   Cont med mgmt  Stan need outpt vasc surveillance if Dc'ed Seen ex'ed dw'ed PA  Adm'ed w global symps  No focal neuro symps reported  ?Baseline MS  Understands [place]bour[place] and responds w gestures (?baseline)  No other periph vasc probs reported    Car US reviewed:   L ICA mod stenosis to my eye (244/47, ratio 3)    A/P:   Carotid stenosis  ?neuro status    Rec:   CTA neck and brain to clarify anatomy/degree of stenosis  Neuro eval   Cont med mgmt  Stan need outpt vasc surveillance if Dc'ed    ***Addend:  5/21  CTA reviewed: High gr L ICA stenosis.  Spoke to pt's daughter by tel: Pt had R hand swelling- ?R hand function problems in Dec.  Unclear if he had harper.  Was started on ASA but was not compliant.  She feels that currently he's back to baseline incl speech and Morris  COnd, options, med mgmt vs intervention (CEA) explained. CEA recommended. At this time pt/daughter request to pursue med mgmt and will dw family re how to proceed.  WIll need neuro eval valentine in light of ?aphasia and/or R hand dysfunction- to clarify symp vs asymp Carotid stenosis- this will also help determine timing of intervention.  Med/cardio clearance preop if pt agrees to surgery

## 2019-05-20 NOTE — CONSULT NOTE ADULT - SUBJECTIVE AND OBJECTIVE BOX
79y Male with PMHx of DM, HTN admitted for syncope workup and was found to have Left carotid artery stenosis on doppler. As per patient he walks with a cane but has had several falls recently for unknown reasons. He denies LOC, no dizziness, no chest pain, no shortness of breath, no abdominal pain.    pmhx: as above  pshx: none  meds:  acetaminophen   Tablet .. 650 milliGRAM(s) Oral every 6 hours PRN  aspirin  chewable 81 milliGRAM(s) Oral daily  atorvastatin 40 milliGRAM(s) Oral at bedtime  enoxaparin Injectable 40 milliGRAM(s) SubCutaneous daily  insulin lispro (HumaLOG) corrective regimen sliding scale   SubCutaneous Before meals and at bedtime  losartan 50 milliGRAM(s) Oral daily  metoprolol tartrate 25 milliGRAM(s) Oral two times a day    Allergy Status Unknown    T(C): 36.7 (05-20-19 @ 15:40), Max: 37.1 (05-19-19 @ 19:48)  HR: 71 (05-20-19 @ 15:40) (31 - 71)  BP: 124/67 (05-20-19 @ 15:40) (124/67 - 148/81)  RR: 18 (05-20-19 @ 15:40) (18 - 18)  SpO2: 99% (05-20-19 @ 15:40) (90% - 99%)  Wt(kg): --    Gen:A&O x3, NAD  Skin: no rashes, no jaundice  Abdomen: soft, nontender, nondistended  Lower Extremities: no edema, no skin discoloration, no calf tenderness zuhair                          15.6   7.08  )-----------( 162      ( 20 May 2019 05:39 )             49.0   05-20    141  |  104  |  24<H>  ----------------------------<  100<H>  3.7   |  27  |  1.01    Ca    8.7      20 May 2019 05:39    < from: US Duplex Carotid Arteries Complete, Bilateral (05.20.19 @ 13:39) >  INTERPRETATION:  Carotid duplex Doppler ultrasound    Indication: syncope    Comparison: None    Carotid duplex Doppler ultrasound is performed. Grayscale ultrasound   demonstrates atherosclerotic plaques in the bilateral carotid bulbs and   left internal carotid artery. The flow velocity measurements during peak   systolic phase in centimeters per second are as the following:    Right CCA 74, ICA 81, ECA 55.  Left CCA 81, , .    The end diastolic velocity measurement for the right ICA is 19, and  for   the left ICA is 47.    The peak systolic ICA/CCA velocity ratio on the right is 1.1, and on the   left is 3.0.    Both vertebral arteries maintain normal antegrade flow direction.    Irregular arterial pulses are noted.    Impression: Severe (greater than or equal to 70%) stenosis in the left   internal carotid artery by velocity criteria.    No hemodynamically significant stenosis in the right internal carotid   artery by velocity criteria.     Irregular arterial pulses are noted.                    HUNTER MONTERROSO M.D., ATTENDING RADIOLOGIST  This document has been electronically signed. May 20 2019  1:47PM    < end of copied text >

## 2019-05-20 NOTE — CONSULT NOTE ADULT - SUBJECTIVE AND OBJECTIVE BOX
CHIEF COMPLAINT:Fall    HPI:-79 years old male who walks with cane, lives with daughter, PMHX of T2DM, HTN was brought in my EMS after he fell on street. Normally, he walks with cane per daughter but went out without it and fell. Patient has multiple falls recently. Pt is complaining of pain in Right arm since fall. Pain is 7/10 in intensity, located in right arm, aggravated y movement. Patient is a poor historian and does not elaborate the circumstances under which he fell. It was an unwitnessed fall.   pt denies any chest pain, palpitations, shortness of breath, nausea, vomiting, abdominal pain or any other symptom.     PAST MEDICAL & SURGICAL HISTORY:  Diabetes  Hypertension      MEDICATIONS  (STANDING):  aspirin  chewable 81 milliGRAM(s) Oral daily  atorvastatin 40 milliGRAM(s) Oral at bedtime  enoxaparin Injectable 40 milliGRAM(s) SubCutaneous daily  insulin lispro (HumaLOG) corrective regimen sliding scale   SubCutaneous Before meals and at bedtime  losartan 50 milliGRAM(s) Oral daily  metoprolol tartrate 25 milliGRAM(s) Oral two times a day    MEDICATIONS  (PRN):  acetaminophen   Tablet .. 650 milliGRAM(s) Oral every 6 hours PRN Temp greater or equal to 38C (100.4F), Mild Pain (1 - 3), Moderate Pain (4 - 6)      FAMILY HISTORY:    No family history of premature coronary artery disease or sudden cardiac death    SOCIAL HISTORY:  Smoking-Non smoker  Alcohol-Denies  Ilicit Drug use-Denies    REVIEW OF SYSTEMS:  Constitutional: [ ] fever, [ ]weight loss, [ ]fatigue Activity [ ] Bedbound,[x ] Ambulates [ ] Unassisted[x ] Cane/Walker [ ] Assistence.  Eyes: [ ] visual changes  Respiratory: [ ]shortness of breath;  [ ] cough, [ ]wheezing, [ ]chills, [ ]hemoptysis  Cardiovascular: [ ] chest pain, [ ]palpitations, [ ]dizziness,  [ ]leg swelling[ ]orthopnea [ ]PND  Gastrointestinal: [ ] abdominal pain, [ ]nausea, [ ]vomiting,  [ ]diarrhea,[ ]constipation  Genitourinary: [ ] dysuria, [ ] hematuria  Neurologic: [ ] headaches [ ] tremors[ ] weakness  Skin: [ ] itching, [ ]burning, [ ] rashes  Endocrine: [ ] heat or cold intolerance  Musculoskeletal: [x ] joint pain or swelling; [ ] muscle, back, or extremity pain  Psychiatric: [ ] depression, [ ]anxiety, [ ]mood swings, or [ ]difficulty sleeping  Hematologic: [ ] easy bruising, [ ] bleeding gums       [ x] All others negative	  [ ] Unable to obtain    Vital Signs Last 24 Hrs  T(C): 36.8 (20 May 2019 07:35), Max: 37.1 (19 May 2019 19:48)  T(F): 98.2 (20 May 2019 07:35), Max: 98.8 (19 May 2019 19:48)  HR: 61 (20 May 2019 07:35) (31 - 69)  BP: 141/66 (20 May 2019 07:35) (135/108 - 148/81)  RR: 18 (20 May 2019 07:35) (18 - 18)  SpO2: 94% (20 May 2019 07:35) (90% - 96%)  I&O's Summary      PHYSICAL EXAM:  General: No acute distress BMI-31.8  HEENT: EOMI, PERRL[ ] Icteric  Neck: Supple, No JVD  Lungs: Equal air entry bilaterally; [ ] Rales [ ] Rhonchi [ ] Wheezing  Heart: Regular rate and rhythm;[x ] Murmurs-  2 /6 [x ] Systolic [ ] Diastolic [ ] Radiation,No rubs, or gallops  Abdomen: Nontender, bowel sounds present  Extremities: No clubbing, cyanosis, or edema[ ] Calf tenderness  Nervous system:  Alert & Oriented X3, no focal deficits  Psychiatric: Normal affect  Skin: No rashes or lesions      LABS:  05-20    141  |  104  |  24<H>  ----------------------------<  100<H>  3.7   |  27  |  1.01    Ca    8.7      20 May 2019 05:39      Creatinine Trend: 1.01<--, 1.20<--, 1.16<--, 1.16<--, 1.14<--                        15.6   7.08  )-----------( 162      ( 20 May 2019 05:39 )             49.0     05-17 [id number] 8.0      ECG [my interpretation]:Sinus rhythm  at 78 bpm with marked sinus arrhythmia  ST & T wave abnormality, consider lateral ischemia  Abnormal ECG      ECHO:Study Date: 5/17/2019  Normal Left Ventricular Systolic Function, (EF = 55 to 60%)   Grade I diastolic dysfunction (Impaired relaxation).   Normal left atrium.  RV systolic pressure is moderately increased at 48 mm Hg.

## 2019-05-20 NOTE — PROGRESS NOTE ADULT - PROBLEM SELECTOR PLAN 1
s/p unwitnessed fall  tele monitoring--> no events noted  cardiac enyzmes negative  f/u carotid doppler--> significant left carotid artery stenosis--> Vascular surgery consulted  echo shows pEF with G1DD and mod pulm HTN  PT following: home with PT  social work following as family wants extended home care hours

## 2019-05-20 NOTE — CONSULT NOTE ADULT - ASSESSMENT
79 years old male who walks with cane, lives with daughter, PMHX of DM, HTN was brought in my EMS after he fell on street.          Problem/Plan - 1:  ·  Problem: Syncope.  Plan: s/p unwitnessed fall  Echo shows pEF with G1DD and mod pulm HTN  Orthostatics-  Likely mechanical.  No further cardiac work-up        Problem/Plan - 2:  ·  Problem: Hypertension.  Plan: c/w cozaar  c/w lopressor  monitor vitals.     Problem/Plan - 3:  ·  Problem: Diabetes.  Plan: c/w hss  monitor fs  HbA1C 8.0.     Problem/Plan - 4:  ·  Problem: Prophylactic measure.  Plan: lovenox subq for dvt ppx.

## 2019-05-20 NOTE — DISCHARGE NOTE PROVIDER - CARE PROVIDER_API CALL
Rufino Granger)  Vascular Surgery  [address] [facility], [address] [place] [address]  Phone: [phone]  Fax: [phone]  Follow Up Time: Rufino Granger)  Vascular Surgery  [address] [facility], [address] [place] [address]  Phone: [phone]  Fax: [phone]  Follow Up Time:     Juany Hernandez)  Clinical Neurophysiology; Neurology  [address] [place] [address], [address] [place] [address]  Phone: [phone]  Fax: [phone]  Follow Up Time:

## 2019-05-20 NOTE — DISCHARGE NOTE PROVIDER - CARE PROVIDERS DIRECT ADDRESSES
,[email or web address],DirectAddress_Unknown

## 2019-05-20 NOTE — PROGRESS NOTE ADULT - SUBJECTIVE AND OBJECTIVE BOX
PGY 1 Note discussed with supervising resident and primary attending    Patient is a 79y old  Male who presents with a chief complaint of s/p fall (20 May 2019 10:43)      INTERVAL HPI/OVERNIGHT EVENTS: offers no new complaints; current symptoms resolving    MEDICATIONS  (STANDING):  aspirin  chewable 81 milliGRAM(s) Oral daily  atorvastatin 40 milliGRAM(s) Oral at bedtime  enoxaparin Injectable 40 milliGRAM(s) SubCutaneous daily  insulin lispro (HumaLOG) corrective regimen sliding scale   SubCutaneous Before meals and at bedtime  losartan 50 milliGRAM(s) Oral daily  metoprolol tartrate 25 milliGRAM(s) Oral two times a day    MEDICATIONS  (PRN):  acetaminophen   Tablet .. 650 milliGRAM(s) Oral every 6 hours PRN Temp greater or equal to 38C (100.4F), Mild Pain (1 - 3), Moderate Pain (4 - 6)      __________________________________________________  REVIEW OF SYSTEMS:    CONSTITUTIONAL: No fever,   EYES: no acute visual disturbances  NECK: No pain or stiffness  RESPIRATORY: No cough; No shortness of breath  CARDIOVASCULAR: No chest pain, no palpitations  GASTROINTESTINAL: No pain. No nausea or vomiting; No diarrhea   NEUROLOGICAL: No headache or numbness, no tremors  MUSCULOSKELETAL: No joint pain, no muscle pain  GENITOURINARY: no dysuria, no frequency, no hesitancy  PSYCHIATRY: no depression , no anxiety  ALL OTHER  ROS negative        Vital Signs Last 24 Hrs  T(C): 36.8 (20 May 2019 11:07), Max: 37.1 (19 May 2019 19:48)  T(F): 98.2 (20 May 2019 11:07), Max: 98.8 (19 May 2019 19:48)  HR: 57 (20 May 2019 11:07) (31 - 69)  BP: 130/70 (20 May 2019 11:07) (130/70 - 148/81)  BP(mean): --  RR: 18 (20 May 2019 11:07) (18 - 18)  SpO2: 96% (20 May 2019 11:07) (90% - 96%)    ________________________________________________  PHYSICAL EXAM:  GENERAL: NAD  HEENT: Normocephalic;  conjunctivae and sclerae clear; moist mucous membranes;   NECK : supple  CHEST/LUNG: Clear to auscultation bilaterally with good air entry   HEART: S1 S2  regular; no murmurs, gallops or rubs  ABDOMEN: Soft, Nontender, Nondistended; Bowel sounds present  EXTREMITIES: no cyanosis; no edema; no calf tenderness  SKIN: warm and dry; no rash  NERVOUS SYSTEM:  Awake and alert; Oriented  to place, person and time ; no new deficits    _________________________________________________  LABS:                        15.6   7.08  )-----------( 162      ( 20 May 2019 05:39 )             49.0     05-20    141  |  104  |  24<H>  ----------------------------<  100<H>  3.7   |  27  |  1.01    Ca    8.7      20 May 2019 05:39          CAPILLARY BLOOD GLUCOSE      POCT Blood Glucose.: 177 mg/dL (20 May 2019 11:43)  POCT Blood Glucose.: 170 mg/dL (20 May 2019 07:27)  POCT Blood Glucose.: 97 mg/dL (19 May 2019 21:03)  POCT Blood Glucose.: 82 mg/dL (19 May 2019 16:49)        RADIOLOGY & ADDITIONAL TESTS:    Imaging Personally Reviewed:  YES/NO    Consultant(s) Notes Reviewed:   YES/ No    Care Discussed with Consultants :     Plan of care was discussed with patient and /or primary care giver; all questions and concerns were addressed and care was aligned with patient's wishes.

## 2019-05-20 NOTE — CONSULT NOTE ADULT - ATTENDING COMMENTS
Thank you for the courtesy of the consultation,I would be available for any further discussion if needed.  Chavo Conner MD,FACC.  [address] [place], [place] [id number] [phone]

## 2019-05-20 NOTE — DISCHARGE NOTE PROVIDER - NSDCCPCAREPLAN_GEN_ALL_CORE_FT
PRINCIPAL DISCHARGE DIAGNOSIS  Diagnosis: Fall, initial encounter  Assessment and Plan of Treatment:       SECONDARY DISCHARGE DIAGNOSES  Diagnosis: Hypertension  Assessment and Plan of Treatment: Hypertension    Diagnosis: Diabetes  Assessment and Plan of Treatment: Diabetes    Diagnosis: Hyperkalemia  Assessment and Plan of Treatment: Hyperkalemia PRINCIPAL DISCHARGE DIAGNOSIS  Diagnosis: Fall, initial encounter  Assessment and Plan of Treatment: You came in with fall, your syncope is likely related to your carotid stenosis. You are advsied to continue with asprin, statin and plavix. You are advised to follow up with Dr Hernandez and Dr Granger as out-patient for carotid endarterctomy      SECONDARY DISCHARGE DIAGNOSES  Diagnosis: Hypertension  Assessment and Plan of Treatment: Please cotninue with your home medication    Diagnosis: Diabetes  Assessment and Plan of Treatment: Please continyue with your home medication

## 2019-05-20 NOTE — CONSULT NOTE ADULT - ASSESSMENT
78 yo male with left ICA stenosis, syncope symptoms s/p fall    1) recommend CTA of neck and brain  2) cont med management  3) Vascular surgery will follow  4) case and plan discussed with Dr. Granger and agrees

## 2019-05-20 NOTE — DISCHARGE NOTE PROVIDER - HOSPITAL COURSE
Pt is a 79 years old male who walks with cane, lives with daughter, PMHX of DM, HTN was brought in my EMS after he fell on street. Normally, he walks with cane per daughter but went out without it and fell. Patient has multiple falls recently. Pt is complaining of pain in Right arm since fall. Pain is 7/10 in intensity, located in right arm, aggravated y movement. Patient is a poor historian and does not elaborate the circumstances under which he fell. It was an unwitnessed fall.     pt denies any chest pain, palpitations, shortness of breath, nausea, vomiting, abdominal pain or any other symptom. Pt was admitted to telemonitoring unit fo cardiac monitoring and further workup. Pt underwent echocardiogram that was negative for any acute findings. Pt is a 79 years old male who walks with cane, lives with daughter, PMHX of DM, HTN was brought in my EMS after he fell on street. Normally, he walks with cane per daughter but went out without it and fell. Patient has multiple falls recently. Pt is complaining of pain in Right arm since fall. Pain is 7/10 in intensity, located in right arm, aggravated y movement. Patient is a poor historian and does not elaborate the circumstances under which he fell. It was an unwitnessed fall.     pt denies any chest pain, palpitations, shortness of breath, nausea, vomiting, abdominal pain or any other symptom. Pt was admitted to telemonitoring unit fo cardiac monitoring and further workup. Pt underwent echocardiogram that was negative for any acute findings. Pt also underwent carotid doppler that showed significant stenosis of left carotid artery.    Vascular surgery was consulted and advised ct angio head and neck. Pt is a 79 years old male who walks with cane, lives with daughter, PMHX of DM, HTN was brought in my EMS after he fell on street. Normally, he walks with cane per daughter but went out without it and fell. Patient has multiple falls recently. Pt is complaining of pain in Right arm since fall. Pain is 7/10 in intensity, located in right arm, aggravated y movement. Patient is a poor historian and does not elaborate the circumstances under which he fell. It was an unwitnessed fall.     pt denies any chest pain, palpitations, shortness of breath, nausea, vomiting, abdominal pain or any other symptom. Pt was admitted to telemonitoring unit fo cardiac monitoring and further workup. Pt underwent echocardiogram that was negative for any acute findings. Pt also underwent carotid doppler that showed significant stenosis of left carotid artery.    Vascular surgery was consulted and advised ct angio head and neck whch showed 70% stenosis. your mri was neagitev for stroke. You are being discharged with advice to follow up with neurologist as outpt and vascular surgery

## 2019-05-21 LAB
ANION GAP SERPL CALC-SCNC: 5 MMOL/L — SIGNIFICANT CHANGE UP (ref 5–17)
BUN SERPL-MCNC: 24 MG/DL — HIGH (ref 7–18)
CALCIUM SERPL-MCNC: 9.1 MG/DL — SIGNIFICANT CHANGE UP (ref 8.4–10.5)
CHLORIDE SERPL-SCNC: 109 MMOL/L — HIGH (ref 96–108)
CO2 SERPL-SCNC: 30 MMOL/L — SIGNIFICANT CHANGE UP (ref 22–31)
CREAT SERPL-MCNC: 1.16 MG/DL — SIGNIFICANT CHANGE UP (ref 0.5–1.3)
GLUCOSE BLDC GLUCOMTR-MCNC: 109 MG/DL — HIGH (ref 70–99)
GLUCOSE BLDC GLUCOMTR-MCNC: 111 MG/DL — HIGH (ref 70–99)
GLUCOSE BLDC GLUCOMTR-MCNC: 131 MG/DL — HIGH (ref 70–99)
GLUCOSE BLDC GLUCOMTR-MCNC: 135 MG/DL — HIGH (ref 70–99)
GLUCOSE SERPL-MCNC: 112 MG/DL — HIGH (ref 70–99)
HCT VFR BLD CALC: 50.9 % — HIGH (ref 39–50)
HGB BLD-MCNC: 16 G/DL — SIGNIFICANT CHANGE UP (ref 13–17)
MCHC RBC-ENTMCNC: 27.2 PG — SIGNIFICANT CHANGE UP (ref 27–34)
MCHC RBC-ENTMCNC: 31.4 GM/DL — LOW (ref 32–36)
MCV RBC AUTO: 86.6 FL — SIGNIFICANT CHANGE UP (ref 80–100)
NRBC # BLD: 0 /100 WBCS — SIGNIFICANT CHANGE UP (ref 0–0)
PLATELET # BLD AUTO: 160 K/UL — SIGNIFICANT CHANGE UP (ref 150–400)
POTASSIUM SERPL-MCNC: 4.4 MMOL/L — SIGNIFICANT CHANGE UP (ref 3.5–5.3)
POTASSIUM SERPL-SCNC: 4.4 MMOL/L — SIGNIFICANT CHANGE UP (ref 3.5–5.3)
RBC # BLD: 5.88 M/UL — HIGH (ref 4.2–5.8)
RBC # FLD: 13.6 % — SIGNIFICANT CHANGE UP (ref 10.3–14.5)
SODIUM SERPL-SCNC: 144 MMOL/L — SIGNIFICANT CHANGE UP (ref 135–145)
WBC # BLD: 7.48 K/UL — SIGNIFICANT CHANGE UP (ref 3.8–10.5)
WBC # FLD AUTO: 7.48 K/UL — SIGNIFICANT CHANGE UP (ref 3.8–10.5)

## 2019-05-21 PROCEDURE — 70496 CT ANGIOGRAPHY HEAD: CPT | Mod: 26

## 2019-05-21 PROCEDURE — 70498 CT ANGIOGRAPHY NECK: CPT | Mod: 26

## 2019-05-21 PROCEDURE — 99222 1ST HOSP IP/OBS MODERATE 55: CPT

## 2019-05-21 PROCEDURE — 99233 SBSQ HOSP IP/OBS HIGH 50: CPT | Mod: GC

## 2019-05-21 RX ADMIN — LOSARTAN POTASSIUM 50 MILLIGRAM(S): 100 TABLET, FILM COATED ORAL at 06:48

## 2019-05-21 RX ADMIN — Medication 25 MILLIGRAM(S): at 06:48

## 2019-05-21 RX ADMIN — Medication 81 MILLIGRAM(S): at 12:25

## 2019-05-21 RX ADMIN — ATORVASTATIN CALCIUM 40 MILLIGRAM(S): 80 TABLET, FILM COATED ORAL at 21:41

## 2019-05-21 RX ADMIN — Medication 25 MILLIGRAM(S): at 19:05

## 2019-05-21 RX ADMIN — ENOXAPARIN SODIUM 40 MILLIGRAM(S): 100 INJECTION SUBCUTANEOUS at 12:25

## 2019-05-21 NOTE — PROGRESS NOTE ADULT - SUBJECTIVE AND OBJECTIVE BOX
See consult note 5/20  CTA reviewed: High gr L ICA stenosis.  Spoke to pt's daughter by tel: Pt had R hand swelling- ?R hand function problems in Dec.  Unclear if he had harper.  Was started on ASA but was not compliant.  She feels that currently he's back to baseline incl speech and Morris  COnd, options, med mgmt vs intervention (CEA) explained. CEA recommended. At this time pt/daughter request to pursue med mgmt and will dw family re how to proceed.  WIll need neuro eval valentine in light of ?aphasia and/or R hand dysfunction- to clarify symp vs asymp Carotid stenosis- this will also help determine timing of intervention.  Med/cardio clearance preop if pt agrees to surgery

## 2019-05-21 NOTE — PROGRESS NOTE ADULT - SUBJECTIVE AND OBJECTIVE BOX
PGY 1 Note discussed with supervising resident and primary attending    Patient is a 79y old  Male who presents with a chief complaint of s/p fall (20 May 2019 17:08)      INTERVAL HPI/OVERNIGHT EVENTS: offers no new complaints; current symptoms resolving    MEDICATIONS  (STANDING):  aspirin  chewable 81 milliGRAM(s) Oral daily  atorvastatin 40 milliGRAM(s) Oral at bedtime  enoxaparin Injectable 40 milliGRAM(s) SubCutaneous daily  insulin lispro (HumaLOG) corrective regimen sliding scale   SubCutaneous Before meals and at bedtime  losartan 50 milliGRAM(s) Oral daily  metoprolol tartrate 25 milliGRAM(s) Oral two times a day    MEDICATIONS  (PRN):  acetaminophen   Tablet .. 650 milliGRAM(s) Oral every 6 hours PRN Temp greater or equal to 38C (100.4F), Mild Pain (1 - 3), Moderate Pain (4 - 6)      __________________________________________________  REVIEW OF SYSTEMS:    CONSTITUTIONAL: No fever,   EYES: no acute visual disturbances  NECK: No pain or stiffness  RESPIRATORY: No cough; No shortness of breath  CARDIOVASCULAR: No chest pain, no palpitations  GASTROINTESTINAL: No pain. No nausea or vomiting; No diarrhea   NEUROLOGICAL: No headache or numbness, no tremors  MUSCULOSKELETAL: No joint pain, no muscle pain  GENITOURINARY: no dysuria, no frequency, no hesitancy  PSYCHIATRY: no depression , no anxiety  ALL OTHER  ROS negative        Vital Signs Last 24 Hrs  T(C): 36.2 (21 May 2019 11:30), Max: 37 (21 May 2019 06:00)  T(F): 97.2 (21 May 2019 11:30), Max: 98.6 (21 May 2019 06:00)  HR: 70 (21 May 2019 11:30) (60 - 71)  BP: 148/66 (21 May 2019 11:30) (123/60 - 152/64)  BP(mean): --  RR: 18 (21 May 2019 11:30) (17 - 20)  SpO2: 95% (21 May 2019 11:30) (95% - 99%)    ________________________________________________  PHYSICAL EXAM:  GENERAL: NAD  HEENT: Normocephalic;  conjunctivae and sclerae clear; moist mucous membranes;   NECK : supple  CHEST/LUNG: Clear to auscultation bilaterally with good air entry   HEART: S1 S2  regular; no murmurs, gallops or rubs  ABDOMEN: Soft, Nontender, Nondistended; Bowel sounds present  EXTREMITIES: no cyanosis; no edema; no calf tenderness  SKIN: warm and dry; no rash  NERVOUS SYSTEM:  Awake and alert; Oriented  to place, person and time ; no new deficits    _________________________________________________  LABS:                        16.0   7.48  )-----------( 160      ( 21 May 2019 07:07 )             50.9     05-21    144  |  109<H>  |  24<H>  ----------------------------<  112<H>  4.4   |  30  |  1.16    Ca    9.1      21 May 2019 07:07          CAPILLARY BLOOD GLUCOSE      POCT Blood Glucose.: 131 mg/dL (21 May 2019 11:33)  POCT Blood Glucose.: 111 mg/dL (21 May 2019 07:37)  POCT Blood Glucose.: 157 mg/dL (20 May 2019 21:02)  POCT Blood Glucose.: 150 mg/dL (20 May 2019 16:37)        RADIOLOGY & ADDITIONAL TESTS:    Imaging Personally Reviewed:  YES/NO    Consultant(s) Notes Reviewed:   YES/ No    Care Discussed with Consultants :     Plan of care was discussed with patient and /or primary care giver; all questions and concerns were addressed and care was aligned with patient's wishes.

## 2019-05-22 LAB
ANION GAP SERPL CALC-SCNC: 7 MMOL/L — SIGNIFICANT CHANGE UP (ref 5–17)
BUN SERPL-MCNC: 24 MG/DL — HIGH (ref 7–18)
CALCIUM SERPL-MCNC: 8.8 MG/DL — SIGNIFICANT CHANGE UP (ref 8.4–10.5)
CHLORIDE SERPL-SCNC: 107 MMOL/L — SIGNIFICANT CHANGE UP (ref 96–108)
CO2 SERPL-SCNC: 30 MMOL/L — SIGNIFICANT CHANGE UP (ref 22–31)
CREAT SERPL-MCNC: 1.09 MG/DL — SIGNIFICANT CHANGE UP (ref 0.5–1.3)
GLUCOSE BLDC GLUCOMTR-MCNC: 101 MG/DL — HIGH (ref 70–99)
GLUCOSE BLDC GLUCOMTR-MCNC: 113 MG/DL — HIGH (ref 70–99)
GLUCOSE BLDC GLUCOMTR-MCNC: 118 MG/DL — HIGH (ref 70–99)
GLUCOSE BLDC GLUCOMTR-MCNC: 118 MG/DL — HIGH (ref 70–99)
GLUCOSE SERPL-MCNC: 101 MG/DL — HIGH (ref 70–99)
HCT VFR BLD CALC: 49.1 % — SIGNIFICANT CHANGE UP (ref 39–50)
HGB BLD-MCNC: 15.2 G/DL — SIGNIFICANT CHANGE UP (ref 13–17)
MAGNESIUM SERPL-MCNC: 2.3 MG/DL — SIGNIFICANT CHANGE UP (ref 1.6–2.6)
MCHC RBC-ENTMCNC: 27 PG — SIGNIFICANT CHANGE UP (ref 27–34)
MCHC RBC-ENTMCNC: 31 GM/DL — LOW (ref 32–36)
MCV RBC AUTO: 87.2 FL — SIGNIFICANT CHANGE UP (ref 80–100)
NRBC # BLD: 0 /100 WBCS — SIGNIFICANT CHANGE UP (ref 0–0)
PHOSPHATE SERPL-MCNC: 3.6 MG/DL — SIGNIFICANT CHANGE UP (ref 2.5–4.5)
PLATELET # BLD AUTO: 147 K/UL — LOW (ref 150–400)
POTASSIUM SERPL-MCNC: 4.4 MMOL/L — SIGNIFICANT CHANGE UP (ref 3.5–5.3)
POTASSIUM SERPL-SCNC: 4.4 MMOL/L — SIGNIFICANT CHANGE UP (ref 3.5–5.3)
RBC # BLD: 5.63 M/UL — SIGNIFICANT CHANGE UP (ref 4.2–5.8)
RBC # FLD: 13.5 % — SIGNIFICANT CHANGE UP (ref 10.3–14.5)
SODIUM SERPL-SCNC: 144 MMOL/L — SIGNIFICANT CHANGE UP (ref 135–145)
WBC # BLD: 7.51 K/UL — SIGNIFICANT CHANGE UP (ref 3.8–10.5)
WBC # FLD AUTO: 7.51 K/UL — SIGNIFICANT CHANGE UP (ref 3.8–10.5)

## 2019-05-22 PROCEDURE — 70551 MRI BRAIN STEM W/O DYE: CPT | Mod: 26

## 2019-05-22 PROCEDURE — 99233 SBSQ HOSP IP/OBS HIGH 50: CPT | Mod: GC

## 2019-05-22 RX ADMIN — Medication 81 MILLIGRAM(S): at 11:22

## 2019-05-22 RX ADMIN — Medication 25 MILLIGRAM(S): at 17:19

## 2019-05-22 RX ADMIN — ENOXAPARIN SODIUM 40 MILLIGRAM(S): 100 INJECTION SUBCUTANEOUS at 11:22

## 2019-05-22 RX ADMIN — ATORVASTATIN CALCIUM 40 MILLIGRAM(S): 80 TABLET, FILM COATED ORAL at 22:08

## 2019-05-22 NOTE — PROGRESS NOTE ADULT - PROBLEM SELECTOR PLAN 1
s/p unwitnessed fall  tele monitoring--> no events noted  cardiac enyzmes negative  f/u carotid doppler--> significant left carotid artery stenosis--> Vascular surgery consulted  echo shows pEF with G1DD and mod pulm HTN  PT following: home with PT  social work following as family wants extended home care hours  --> Vascular surgery recommend CEA, Will get MRI to evaluate for stroke a pt family stated hand weakness  --> Pt family now agreeable to CEA.  -->Will c/w aspirin & statin and add plavix if positive for stroke

## 2019-05-22 NOTE — PROGRESS NOTE ADULT - SUBJECTIVE AND OBJECTIVE BOX
PGY 1 Note discussed with supervising resident and primary attending    Patient is a 79y old  Male who presents with a chief complaint of s/p fall  ROBERTO (21 May 2019 16:34)      INTERVAL HPI/OVERNIGHT EVENTS: Pt awaits MRI to rule out stroke, pt family now agrees to undergo surgery as recommended     MEDICATIONS  (STANDING):  aspirin  chewable 81 milliGRAM(s) Oral daily  atorvastatin 40 milliGRAM(s) Oral at bedtime  enoxaparin Injectable 40 milliGRAM(s) SubCutaneous daily  insulin lispro (HumaLOG) corrective regimen sliding scale   SubCutaneous Before meals and at bedtime  losartan 50 milliGRAM(s) Oral daily  metoprolol tartrate 25 milliGRAM(s) Oral two times a day    MEDICATIONS  (PRN):  acetaminophen   Tablet .. 650 milliGRAM(s) Oral every 6 hours PRN Temp greater or equal to 38C (100.4F), Mild Pain (1 - 3), Moderate Pain (4 - 6)      __________________________________________________  REVIEW OF SYSTEMS:    CONSTITUTIONAL: No fever,   EYES: no acute visual disturbances  NECK: No pain or stiffness  RESPIRATORY: No cough; No shortness of breath  CARDIOVASCULAR: No chest pain, no palpitations  GASTROINTESTINAL: No pain. No nausea or vomiting; No diarrhea   NEUROLOGICAL: No headache or numbness, no tremors  MUSCULOSKELETAL: No joint pain, no muscle pain  GENITOURINARY: no dysuria, no frequency, no hesitancy  PSYCHIATRY: no depression , no anxiety  ALL OTHER  ROS negative        Vital Signs Last 24 Hrs  T(C): 37.1 (22 May 2019 11:33), Max: 37.1 (22 May 2019 11:33)  T(F): 98.7 (22 May 2019 11:33), Max: 98.7 (22 May 2019 11:33)  HR: 66 (22 May 2019 11:33) (39 - 70)  BP: 136/62 (22 May 2019 11:33) (119/46 - 140/79)  BP(mean): --  RR: 18 (22 May 2019 11:33) (18 - 20)  SpO2: 95% (22 May 2019 11:33) (94% - 97%)    ________________________________________________  PHYSICAL EXAM:  GENERAL: NAD  HEENT: Normocephalic;  conjunctivae and sclerae clear; moist mucous membranes;   NECK : supple  CHEST/LUNG: Clear to auscultation bilaterally with good air entry   HEART: S1 S2  regular; no murmurs, gallops or rubs  ABDOMEN: Soft, Nontender, Nondistended; Bowel sounds present  EXTREMITIES: no cyanosis; no edema; no calf tenderness  SKIN: warm and dry; no rash  NERVOUS SYSTEM:  Awake and alert;     _________________________________________________  LABS:                        15.2   7.51  )-----------( 147      ( 22 May 2019 07:38 )             49.1     05-22    144  |  107  |  24<H>  ----------------------------<  101<H>  4.4   |  30  |  1.09    Ca    8.8      22 May 2019 07:38  Phos  3.6     05-22  Mg     2.3     05-22          CAPILLARY BLOOD GLUCOSE      POCT Blood Glucose.: 113 mg/dL (22 May 2019 11:38)  POCT Blood Glucose.: 101 mg/dL (22 May 2019 07:50)  POCT Blood Glucose.: 109 mg/dL (21 May 2019 20:56)  POCT Blood Glucose.: 135 mg/dL (21 May 2019 16:47)        RADIOLOGY & ADDITIONAL TESTS:    Imaging Personally Reviewed:  YES/NO    Consultant(s) Notes Reviewed:   YES/ No    Care Discussed with Consultants :     Plan of care was discussed with patient and /or primary care giver; all questions and concerns were addressed and care was aligned with patient's wishes.

## 2019-05-22 NOTE — PROGRESS NOTE ADULT - PROBLEM SELECTOR PLAN 2
c/w cozaar  c/w lopressor  monitor vitals
c/w cozaar  c/w lopressor  monitor vitals
c/w cozaar  c/w lopressor  Bp in acceptable range
c/w cozaar  c/w lopressor  monitor vitals

## 2019-05-22 NOTE — PROGRESS NOTE ADULT - ASSESSMENT
79 years old male who walks with cane, lives with daughter, PMHX of DM, HTN was brought in my EMS after he fell on street. Normally, he walks with cane per daughter but went out without it and fell. Patient has multiple falls recently. Pt is complaining of pain in Right arm since fall. Pain is 7/10 in intensity, located in right arm, aggravated y movement. Patient is a poor historian and does not elaborate the circumstances under which he fell. It was an unwitnessed fall.
79 years old male who walks with cane, lives with daughter, PMHX of DM, HTN was brought in my EMS after he fell on street. Normally, he walks with cane per daughter but went out without it and fell. Patient has multiple falls recently. Pt is complaining of pain in Right arm since fall. Pain is 7/10 in intensity, located in right arm, aggravated y movement. Patient is a poor historian and does not elaborate the circumstances under which he fell. It was an unwitnessed fall.
79 years old male who walks with cane, lives with daughter, PMHX of DM, HTN was brought in my EMS after he fell on street. Normally, he walks with cane per daughter but went out without it and fell. Patient has multiple falls recently. Pt is complaining of pain in Right arm since fall. Pain is 7/10 in intensity, located in right arm, aggravated y movement. Patient is a poor historian and does not elaborate the circumstances under which he fell. It was an unwitnessed fall.      Problem/Plan - 1:  ·  Problem: Syncope.  Plan: s/p unwitnessed fall   Ct  brain and C spine with no acute findings. Right humerus xray with no fracture.   cardiac enzymes negative  f/u carotid doppler  echo shows pEF with G1DD and mod pulm HTN  PT following: home with PT  social work following as family wants extended home care hours.     Problem/Plan - 2:  ·  Problem: Hypertension.  Plan: c/w cozaar  c/w lopressor  monitor vitals.      Problem/Plan - 3:  ·  Problem: Diabetes.  Plan: c/w hss  monitor fs  HbA1C 8.0.      Problem/Plan - 4:  ·  Problem: Prophylactic measure.  Plan: lovenox subq for dvt ppx.
79 years old male who walks with cane, lives with daughter, PMHX of DM, HTN was brought in my EMS after he fell on street. Normally, he walks with cane per daughter but went out without it and fell. Patient has multiple falls recently. Pt is complaining of pain in Right arm since fall. Pain is 7/10 in intensity, located in right arm, aggravated y movement. Patient is a poor historian and does not elaborate the circumstances under which he fell. It was an unwitnessed fall.
79 years old male who walks with cane, lives with daughter, PMHX of DM, HTN was brought in my EMS after he fell on street. Normally, he walks with cane per daughter but went out without it and fell. Patient has multiple falls recently. Pt is complaining of pain in Right arm since fall. Pain is 7/10 in intensity, located in right arm, aggravated y movement. Patient is a poor historian and does not elaborate the circumstances under which he fell. It was an unwitnessed fall.

## 2019-05-22 NOTE — DISCHARGE NOTE NURSING/CASE MANAGEMENT/SOCIAL WORK - NSDCDPATPORTLINK_GEN_ALL_CORE
You can access the [facility] Patient Portal, offered by [facility], by registering with the following website: [email or web address]

## 2019-05-22 NOTE — PROGRESS NOTE ADULT - ATTENDING COMMENTS
Agree with above   patient seen and examined. Daughter was present during examination.   Initially were planing for discharge, but was pending MRI and MRI reading.   Will discuss with Dr. Granger in AM the further plans. Family now agree for Carotid artery endarterectomy.     Vital signs and labs reviewed     Vital Signs Last 24 Hrs  T(C): 36.5 (22 May 2019 19:49), Max: 37.1 (22 May 2019 11:33)  T(F): 97.7 (22 May 2019 19:49), Max: 98.7 (22 May 2019 11:33)  HR: 68 (22 May 2019 15:57) (39 - 68)  BP: 153/62 (22 May 2019 15:57) (131/58 - 153/62)  BP(mean): --  RR: 18 (22 May 2019 19:49) (18 - 18)  SpO2: 97% (22 May 2019 19:49) (94% - 97%)    Assessment and plan as above and it discussed with Dr. Dill PGY1
Agree with above, except:   patient seen and examined. He is [place] and cant provide any ROS. Laying in bed and smiling. Spoke to patients daughter on the phone. Initially she said that her father does not have any significant medical history and only suffers from [place].  When she was informed that he has >80% stenosis of left ICA, she said that he had some arm weakness, not able to hold a spoon.   Spoke to Dr. Granger and reviewed the CTA findings with him. Discussed the opinions for medical management vs carotid endarterectomy.   Also, discussed with Dr. Hernandez -neurologist   Will get MRI of brain to rule out stroke.   Per Dr. Granger., patient would need carotid endarterectomy sometimes soon.     Spoke to patient daughter again and discussed the case: she is not sure about the procedure and would like to discuss with her family.
Patient was examined and discussed with Dr. Dill.  Vascular Surgery consultation, appreciated.  Will request CT angio of carotids.
79 years old male, PMHX of DM, HTN was brought in my EMS after he fell on street. Did not use his cane. Patient has multiple falls recently. Pt c/o pain in Right arm since fall. Pt admitted to telemetry. Syncope workup in progress. Carotid US pending. Ct  brain and C spine with no acute findings. Right humerus xray with no fracture. Dispo: family requesting increased HA hours.

## 2019-05-23 VITALS — DIASTOLIC BLOOD PRESSURE: 77 MMHG | SYSTOLIC BLOOD PRESSURE: 104 MMHG | HEART RATE: 78 BPM

## 2019-05-23 LAB
GLUCOSE BLDC GLUCOMTR-MCNC: 106 MG/DL — HIGH (ref 70–99)
GLUCOSE BLDC GLUCOMTR-MCNC: 146 MG/DL — HIGH (ref 70–99)
GLUCOSE BLDC GLUCOMTR-MCNC: 191 MG/DL — HIGH (ref 70–99)

## 2019-05-23 PROCEDURE — 82553 CREATINE MB FRACTION: CPT

## 2019-05-23 PROCEDURE — 82607 VITAMIN B-12: CPT

## 2019-05-23 PROCEDURE — 80061 LIPID PANEL: CPT

## 2019-05-23 PROCEDURE — 85027 COMPLETE CBC AUTOMATED: CPT

## 2019-05-23 PROCEDURE — 73030 X-RAY EXAM OF SHOULDER: CPT

## 2019-05-23 PROCEDURE — 71045 X-RAY EXAM CHEST 1 VIEW: CPT

## 2019-05-23 PROCEDURE — 93880 EXTRACRANIAL BILAT STUDY: CPT

## 2019-05-23 PROCEDURE — 85730 THROMBOPLASTIN TIME PARTIAL: CPT

## 2019-05-23 PROCEDURE — 84484 ASSAY OF TROPONIN QUANT: CPT

## 2019-05-23 PROCEDURE — 85610 PROTHROMBIN TIME: CPT

## 2019-05-23 PROCEDURE — 80307 DRUG TEST PRSMV CHEM ANLYZR: CPT

## 2019-05-23 PROCEDURE — 36415 COLL VENOUS BLD VENIPUNCTURE: CPT

## 2019-05-23 PROCEDURE — 99222 1ST HOSP IP/OBS MODERATE 55: CPT

## 2019-05-23 PROCEDURE — 80053 COMPREHEN METABOLIC PANEL: CPT

## 2019-05-23 PROCEDURE — 93306 TTE W/DOPPLER COMPLETE: CPT

## 2019-05-23 PROCEDURE — 70450 CT HEAD/BRAIN W/O DYE: CPT

## 2019-05-23 PROCEDURE — 70551 MRI BRAIN STEM W/O DYE: CPT

## 2019-05-23 PROCEDURE — 70496 CT ANGIOGRAPHY HEAD: CPT

## 2019-05-23 PROCEDURE — 73090 X-RAY EXAM OF FOREARM: CPT

## 2019-05-23 PROCEDURE — 82962 GLUCOSE BLOOD TEST: CPT

## 2019-05-23 PROCEDURE — 93005 ELECTROCARDIOGRAM TRACING: CPT

## 2019-05-23 PROCEDURE — 83036 HEMOGLOBIN GLYCOSYLATED A1C: CPT

## 2019-05-23 PROCEDURE — 84100 ASSAY OF PHOSPHORUS: CPT

## 2019-05-23 PROCEDURE — 72125 CT NECK SPINE W/O DYE: CPT

## 2019-05-23 PROCEDURE — 70498 CT ANGIOGRAPHY NECK: CPT

## 2019-05-23 PROCEDURE — 99238 HOSP IP/OBS DSCHRG MGMT 30/<: CPT | Mod: GC

## 2019-05-23 PROCEDURE — 72170 X-RAY EXAM OF PELVIS: CPT

## 2019-05-23 PROCEDURE — 87086 URINE CULTURE/COLONY COUNT: CPT

## 2019-05-23 PROCEDURE — 96372 THER/PROPH/DIAG INJ SC/IM: CPT

## 2019-05-23 PROCEDURE — 83735 ASSAY OF MAGNESIUM: CPT

## 2019-05-23 PROCEDURE — 81001 URINALYSIS AUTO W/SCOPE: CPT

## 2019-05-23 PROCEDURE — 80048 BASIC METABOLIC PNL TOTAL CA: CPT

## 2019-05-23 PROCEDURE — 73060 X-RAY EXAM OF HUMERUS: CPT

## 2019-05-23 PROCEDURE — 99231 SBSQ HOSP IP/OBS SF/LOW 25: CPT

## 2019-05-23 PROCEDURE — 99285 EMERGENCY DEPT VISIT HI MDM: CPT | Mod: 25

## 2019-05-23 PROCEDURE — 82550 ASSAY OF CK (CPK): CPT

## 2019-05-23 PROCEDURE — 84443 ASSAY THYROID STIM HORMONE: CPT

## 2019-05-23 RX ORDER — CLOPIDOGREL BISULFATE 75 MG/1
1 TABLET, FILM COATED ORAL
Qty: 30 | Refills: 0
Start: 2019-05-23 | End: 2019-06-21

## 2019-05-23 RX ORDER — ASPIRIN/CALCIUM CARB/MAGNESIUM 324 MG
1 TABLET ORAL
Qty: 30 | Refills: 0
Start: 2019-05-23 | End: 2019-06-21

## 2019-05-23 RX ORDER — ASPIRIN/CALCIUM CARB/MAGNESIUM 324 MG
1 TABLET ORAL
Qty: 0 | Refills: 0 | DISCHARGE

## 2019-05-23 RX ORDER — ATORVASTATIN CALCIUM 80 MG/1
1 TABLET, FILM COATED ORAL
Qty: 30 | Refills: 0
Start: 2019-05-23 | End: 2019-06-21

## 2019-05-23 RX ADMIN — ENOXAPARIN SODIUM 40 MILLIGRAM(S): 100 INJECTION SUBCUTANEOUS at 12:32

## 2019-05-23 RX ADMIN — Medication 25 MILLIGRAM(S): at 05:43

## 2019-05-23 RX ADMIN — LOSARTAN POTASSIUM 50 MILLIGRAM(S): 100 TABLET, FILM COATED ORAL at 05:43

## 2019-05-23 RX ADMIN — Medication 81 MILLIGRAM(S): at 12:32

## 2019-05-23 NOTE — PROGRESS NOTE ADULT - SUBJECTIVE AND OBJECTIVE BOX
FU ROBERTO  CLinically unchanged.  MRI noted: no new infarcts.  Ch changes present    DW'ed neuro attd- full neuro eval in progress: pt has L ICA high gr stenosis which is asymptomatic, but pt may be suffering from dementia (?cause for aphasia?)    Will await full neuro eval to help determine whether this pt would benefit from prophyl L CEA vs med mgmt

## 2019-05-23 NOTE — CONSULT NOTE ADULT - SUBJECTIVE AND OBJECTIVE BOX
Patient is a 79y old  Male who presents with a chief complaint of s/p fall (23 May 2019 23:07)      HPI:  He had stepped out for his usual short walk and was found by passers by on the path where he had suffered a fall. Witnesses called emergency  medical services and he was brought to the hospital. A carotid Doppler done to investigate transient ischemic attack that might have caused the fall  reviealed 80% narrowing of the left internal carotid artery. However his complaints were  not  localized to the right arm and leg. His daughter who provided the history and whose reliability is considered to be excellent describes her father suffering gradual worsening of gait unsteadiness. Initially he had near falls and increased swaying but recently has suffered a few falls. She describes the unsteadiness worsening after he has walked some distance. His effort toleranceshas reportedly decreased from 2 or 3 blocks to less than one block over the past 6 months.    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 After his wife passed away he moved to the [place] to live with his daughter. initially he was completely independent and even helped with some of the chores around the house, but gradually beginning about 2 years ago he began to require assistance for more and more of his own chores.   PAST MEDICAL & SURGICAL HISTORY:  Diabetes  Hypertension      FAMILY HISTORY:        Social Hx:  Nonsmoker, no drug or alcohol use    MEDICATIONS  (STANDING):  aspirin  chewable 81 milliGRAM(s) Oral daily  atorvastatin 40 milliGRAM(s) Oral at bedtime  enoxaparin Injectable 40 milliGRAM(s) SubCutaneous daily  insulin lispro (HumaLOG) corrective regimen sliding scale   SubCutaneous Before meals and at bedtime  losartan 50 milliGRAM(s) Oral daily  metoprolol tartrate 25 milliGRAM(s) Oral two times a day       Allergies    Allergy Status Unknown    Intolerances        ROS: Pertinent positives in HPI, all other ROS were reviewed and are negative.      Vital Signs Last 24 Hrs  T(C): 36.6 (23 May 2019 15:35), Max: 36.9 (23 May 2019 05:00)  T(F): 97.8 (23 May 2019 15:35), Max: 98.4 (23 May 2019 05:00)  HR: 78 (23 May 2019 17:51) (53 - 78)  BP: 104/77 (23 May 2019 17:51) (104/77 - 162/60)  BP(mean): --  RR: 18 (23 May 2019 15:35) (18 - 20)  SpO2: 93% (23 May 2019 15:35) (93% - 97%)        Constitutional: awake and alert.  HEENT: PERRLA, EOMI,   Neck: Supple.  Respiratory: Breath sounds are clear bilaterally  Cardiovascular: S1 and S2, regular rhythm  Gastrointestinal: soft, nontender  Extremities:  no edema  Vascular: Cartid Bruit - no  Musculoskeletal: no joint swelling/tenderness, no abnormal movements  Skin: No rashes    Neurological exam:  HF: He has profound hearing loss; he appears to be non fluent, even replying to his daughter in telegraphic speech. He is almost mute but is daughter reports he follows her questions  CN: IRVING, EOMI, VFF, facial sensation normal, RIGHT  NLFD, tongue midline, Palate moves equally, SCM equal bilaterally  Motor: rIGHT pronator drift, Strength 5/5 in all 4 ext, normal bulk and tone, no tremor, rigidity or bradykinesia.    Sens: ICANNOT CHECK RELIABLY   Reflexes: Symmetric and normal . BJ 2+, BR 2+, KJ 2+, AJ 0+, downgoing toes b/l  Coord:  No FNFA, dysmetria, MARY intact   Gait/Balance: Normal/Cannot test    NIHSS:          Labs:                        15.2   7.51  )-----------( 147      ( 22 May 2019 07:38 )             49.1     05-22    144  |  107  |  24<H>  ----------------------------<  101<H>  4.4   |  30  |  1.09    Ca    8.8      22 May 2019 07:38  Phos  3.6     05-22  Mg     2.3     05-22      05-17 [id number] 8.0    05-17 Chol 226<H>  HDL 42 Trig 106      Radiology report:  - CT head:  < from: CT Angio Neck w/ IV Cont (05.21.19 @ 13:46) >  EXAM:  CT ANGIO BRAIN (W)AW IC                          EXAM:  CT ANGIO NECK (W)AW IC                            PROCEDURE DATE:  05/21/2019          INTERPRETATION:  CLINICAL INFORMATION:  Syncope and carotid stenosis.      TECHNIQUE:  Initially, a noncontrast CT of the brain is performed with   axial images from the cranial vertex to the skull base.  Thereafter, a   bolus of IV contrast is administered to acquire a CT angiogram of the   vertebral and carotid arterial vasculature from the aortic arch to the   skull vertex. Images are reformatted in sagittal and coronal planes.   Maximum intensity projection images are also included. 90cc of Omnipaque   350 was administered without event.  10cc was discarded.    The study is correlated witha CT of the head from 5/17/2019 and carotid   duplex from 5/20/2019.    FINDINGS:    CT BRAIN:    There is no acute intra-axial or extra-axial hemorrhage. There is no mass   effect or shift of the midline. The basal cisterns are not effaced. There   is cerebral volume loss and mild ventriculomegaly. There are mild chronic   ischemic changes in the frontoparietal white matter. There is no CT   evidence of an acute transcortical infarct. There are minimal   atherosclerotic calcifications of the intracranial carotid and intradural   vertebral arteries.    The regional soft tissues and osseous structures are unremarkable apart   from a defect in the anterior nasal cartilaginous septum.. The visualized   paranasal sinuses and tympanic/mastoid cavities are clear apart from a   right mastoid effusion which is likely chronic given the thick sclerotic   walls mastoid cortex.    CT ANGIOGRAM:    There is a three-vessel aortic arch. There is atherosclerotic disease of   the great vessels with mild luminal narrowing of the right   brachiocephalic artery. The common carotid arteries are widely patent   from the origins to the bifurcations. There is atherosclerotic disease at   the common carotid bifurcations and involving the proximal internal   carotid arteries (left greater than right). There is high-grade stenosis   at the left internal carotid artery origin with up to 70% stenosis based   on NASCET criteria. On the right, there is moderate atherosclerotic   disease involving the right carotid bulb with eccentric soft atheromatous   plaque and atherosclerotic calcification. There is up to 20% stenosis of   the right internal carotid artery at the carotid bulb based on NASCET   criteria. The cervical vertebral arteries are patent from the origins to   the skull base. The right vertebral artery is dominant.    The skull base and intracranial carotid arteries are patent without   significant stenosis. The proximal MCAs and ACAs are patent without   significant stenosis. The anterior communicating artery is visualized.   Distal ANDREA and MCA branches are grossly symmetric.    The intradural left vertebral artery terminates as a PICA. The skull base   and intradural right vertebral artery and basilar artery are widely   patent. The proximal PCAs are patent without significant stenosis. There   is a fetal right PCA. The left posterior communicating artery is also   visualized. The superior cerebellar artery origins, bilateral AICAs, and   bilateral PICAs are identified.    There is no evidence of an intracranial arterial aneurysm are   arteriovenous malformation on CTA.    The dural sinuses and superficial veins are grossly unremarkable.    The regional soft tissues of the neck are otherwise grossly unremarkable.   The lung apices are clear apart from a 4 mm subpleural nodule in the   right lung apex.    There are multilevel degenerative changes of the spine.      IMPRESSION:    CT BRAIN:  No acute intracranial hemorrhage, mass effect, or CT evidence   of an acute vascular territorial infarct. Mild chronic ischemic changes   in the frontoparietal white matter.    CTA NECK: High-grade stenosis of the left internal carotid artery origin   with up to 70% stenosis based on NASCET criteria secondary to predominant   soft atheromatous plaque. Moderate atherosclerotic disease involving the   right carotid bulb with eccentric soft atheromatous plaque and   atherosclerotic calcification resulting in up to 20% stenosis of the   right internal carotid artery based on NASCET criteria.    CTA HEAD:  No significant stenosis or occlusion of the major proximal   arterial branches.                  PERCY MOORE D.O. ATTENDING RADIOLOGIST  This document has been electronically signed. May 21 2019  2:39PM    < end of copied text > Patient is a 79y old  Male who presents with a chief complaint of s/p fall (23 May 2019 23:07)      HPI:  He had stepped out for his usual short walk and was found by passers by on the path where he had suffered a fall. Witnesses called emergency  medical services and he was brought to the hospital. A carotid Doppler done to investigate transient ischemic attack that might have caused the fall  reviealed 80% narrowing of the left internal carotid artery. However his complaints were  not  localized to the right arm and leg. His daughter who provided the history and whose reliability is considered to be excellent describes her father suffering gradual worsening of gait unsteadiness. Initially he had near falls and increased swaying but recently has suffered a few falls. She describes the unsteadiness worsening after he has walked some distance. His effort toleranceshas reportedly decreased from 2 or 3 blocks to less than one block over the past 6 months.    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 After his wife passed away he moved to the [place] to live with his daughter. initially he was completely independent and even helped with some of the chores around the house, but gradually beginning about 2 years ago he began to require assistance for more and more of his own chores.   PAST MEDICAL & SURGICAL HISTORY:  Diabetes  Hypertension      FAMILY HISTORY:        Social Hx:  Nonsmoker, no drug or alcohol use    MEDICATIONS  (STANDING):  aspirin  chewable 81 milliGRAM(s) Oral daily  atorvastatin 40 milliGRAM(s) Oral at bedtime  enoxaparin Injectable 40 milliGRAM(s) SubCutaneous daily  insulin lispro (HumaLOG) corrective regimen sliding scale   SubCutaneous Before meals and at bedtime  losartan 50 milliGRAM(s) Oral daily  metoprolol tartrate 25 milliGRAM(s) Oral two times a day       Allergies    Allergy Status Unknown    Intolerances        ROS: Pertinent positives in HPI, all other ROS were reviewed and are negative.      Vital Signs Last 24 Hrs  T(C): 36.6 (23 May 2019 15:35), Max: 36.9 (23 May 2019 05:00)  T(F): 97.8 (23 May 2019 15:35), Max: 98.4 (23 May 2019 05:00)  HR: 78 (23 May 2019 17:51) (53 - 78)  BP: 104/77 (23 May 2019 17:51) (104/77 - 162/60)  BP(mean): --  RR: 18 (23 May 2019 15:35) (18 - 20)  SpO2: 93% (23 May 2019 15:35) (93% - 97%)        Constitutional: awake and alert.  HEENT: PERRLA, EOMI,   Neck: Supple.  Respiratory: Breath sounds are clear bilaterally  Cardiovascular: S1 and S2, regular rhythm  Gastrointestinal: soft, nontender  Extremities:  no edema  Vascular: Cartid Bruit - no  Musculoskeletal: no joint swelling/tenderness, no abnormal movements  Skin: No rashes    Neurological exam:  HF: He has profound hearing loss; he appears to be non fluent, even replying to his daughter in telegraphic speech. He is almost mute but is daughter reports he follows her questions  CN: IRVING, EOMI, VFF, facial sensation normal, RIGHT  NLFD, tongue midline, Palate moves equally, SCM equal bilaterally  Motor: RIGHT pronator drift, Strength 4/5 IN RIGHT & 5/5 in  LEFT ext, normal bulk and tone, MILD tremor, rigidity and bradykinesia.    Sens: CANNOT CHECK RELIABLY   Reflexes: Asymmetric and brisker on the right .Left  BJ 2+, BR 2+, KJ 2+, AJ 0+, downgoing toes b/l  Coord:  No FNFA, dysmetria, MARY intact   Gait/Balance: Normal stance but increased swaying to either side during Rombergs; cannot walk tandem    NIHSS: 11  1A: Level of consciousness       0= Alert; keenly responsive    1B: Ask month and age       +2= 0 questions right         1C: "Blink eyes" and "Squeeze Hands"       +2= Performs 0 tasks    2: Horizontal EOMs       0= Normal     3: Visual fields       +1= Partial hemianopia         4: Facial palsy (use grimace if obtunded)       0= Normal symmetry       +1= Minor paralysis (flat NLF, smile asymmetry)         5A: Left arm motor drift (count out loud and use fingers to show count)       0= No drift x 10 seconds      5B: Right arm motor drift              +1= Drift but doesn't hit bed        6A: Left leg motor drift       0= No drift x 10 seconds        6B: Right leg motor drift       +1= Drift but doesn't hit bed         7: Limb ataxia (FNF/heel-shin)       0= No ataxia        8: Sensation       +1= mild-moderate loss: can sense being touched    9: Language/aphasia- describe the scene (on kennedy); name the items; read the sentences (on kennedy)           +1= mild-moderate aphasa: some obvious changes without significant limitation        10: Dysarthria- read the words       0= Normal        11: Extinction/inattention       +1= visual/tactile/auditory/spatial/personal inattention         MRS3      Labs:                        15.2   7.51  )-----------( 147      ( 22 May 2019 07:38 )             49.1     05-22    144  |  107  |  24<H>  ----------------------------<  101<H>  4.4   |  30  |  1.09    Ca    8.8      22 May 2019 07:38  Phos  3.6     05-22  Mg     2.3     05-22 05-17 [id number] 8.0    05-17 Chol 226<H>  HDL 42 Trig 106      Radiology report:  - CT head:  < from: CT Angio Neck w/ IV Cont (05.21.19 @ 13:46) >  EXAM:  CT ANGIO BRAIN (W)AW IC                          EXAM:  CT ANGIO NECK (W)AW IC                            PROCEDURE DATE:  05/21/2019          INTERPRETATION:  CLINICAL INFORMATION:  Syncope and carotid stenosis.      TECHNIQUE:  Initially, a noncontrast CT of the brain is performed with   axial images from the cranial vertex to the skull base.  Thereafter, a   bolus of IV contrast is administered to acquire a CT angiogram of the   vertebral and carotid arterial vasculature from the aortic arch to the   skull vertex. Images are reformatted in sagittal and coronal planes.   Maximum intensity projection images are also included. 90cc of Omnipaque   350 was administered without event.  10cc was discarded.    The study is correlated witha CT of the head from 5/17/2019 and carotid   duplex from 5/20/2019.    FINDINGS:    CT BRAIN:    There is no acute intra-axial or extra-axial hemorrhage. There is no mass   effect or shift of the midline. The basal cisterns are not effaced. There   is cerebral volume loss and mild ventriculomegaly. There are mild chronic   ischemic changes in the frontoparietal white matter. There is no CT   evidence of an acute transcortical infarct. There are minimal   atherosclerotic calcifications of the intracranial carotid and intradural   vertebral arteries.    The regional soft tissues and osseous structures are unremarkable apart   from a defect in the anterior nasal cartilaginous septum.. The visualized   paranasal sinuses and tympanic/mastoid cavities are clear apart from a   right mastoid effusion which is likely chronic given the thick sclerotic   walls mastoid cortex.    CT ANGIOGRAM:    There is a three-vessel aortic arch. There is atherosclerotic disease of   the great vessels with mild luminal narrowing of the right   brachiocephalic artery. The common carotid arteries are widely patent   from the origins to the bifurcations. There is atherosclerotic disease at   the common carotid bifurcations and involving the proximal internal   carotid arteries (left greater than right). There is high-grade stenosis   at the left internal carotid artery origin with up to 70% stenosis based   on NASCET criteria. On the right, there is moderate atherosclerotic   disease involving the right carotid bulb with eccentric soft atheromatous   plaque and atherosclerotic calcification. There is up to 20% stenosis of   the right internal carotid artery at the carotid bulb based on NASCET   criteria. The cervical vertebral arteries are patent from the origins to   the skull base. The right vertebral artery is dominant.    The skull base and intracranial carotid arteries are patent without   significant stenosis. The proximal MCAs and ACAs are patent without   significant stenosis. The anterior communicating artery is visualized.   Distal ANDREA and MCA branches are grossly symmetric.    The intradural left vertebral artery terminates as a PICA. The skull base   and intradural right vertebral artery and basilar artery are widely   patent. The proximal PCAs are patent without significant stenosis. There   is a fetal right PCA. The left posterior communicating artery is also   visualized. The superior cerebellar artery origins, bilateral AICAs, and   bilateral PICAs are identified.    There is no evidence of an intracranial arterial aneurysm are   arteriovenous malformation on CTA.    The dural sinuses and superficial veins are grossly unremarkable.    The regional soft tissues of the neck are otherwise grossly unremarkable.   The lung apices are clear apart from a 4 mm subpleural nodule in the   right lung apex.    There are multilevel degenerative changes of the spine.      IMPRESSION:    CT BRAIN:  No acute intracranial hemorrhage, mass effect, or CT evidence   of an acute vascular territorial infarct. Mild chronic ischemic changes   in the frontoparietal white matter.    CTA NECK: High-grade stenosis of the left internal carotid artery origin   with up to 70% stenosis based on NASCET criteria secondary to predominant   soft atheromatous plaque. Moderate atherosclerotic disease involving the   right carotid bulb with eccentric soft atheromatous plaque and   atherosclerotic calcification resulting in up to 20% stenosis of the   right internal carotid artery based on NASCET criteria.    CTA HEAD:  No significant stenosis or occlusion of the major proximal   arterial branches.                  PERCY MOORE D.O. ATTENDING RADIOLOGIST  This document has been electronically signed. May 21 2019  2:39PM    < end of copied text >

## 2019-05-23 NOTE — CONSULT NOTE ADULT - ASSESSMENT
He has right spastic hemiparesis, non-fluent dysphasia. He cannot repeat or name; he has marked truncal unsteadiness He has right spastic hemiparesis, non-fluent dysphasia. He cannot repeat or name; he has marked truncal unsteadiness. 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 An alternative explanation for his symptoms is language variant of frontotemporal dementia.   Moreover  it is not clear that the carotid stenosis is symptomatic; the effect of anesthesia and carotid endarterectomy are likely far riskier in this situation than all the study patients reported in the literature. Recommend aspirin clopidogrel and elective surgery if acceptable to family

## 2019-05-23 NOTE — PROGRESS NOTE ADULT - PROVIDER SPECIALTY LIST ADULT
Hospitalist
Internal Medicine
Internal Medicine
Vascular Surgery
Vascular Surgery
Internal Medicine
Internal Medicine

## 2019-05-23 NOTE — CONSULT NOTE ADULT - SUBJECTIVE AND OBJECTIVE BOX
Patient is a 79y old  Male who presents with a chief complaint of s/p fall (23 May 2019 15:45)      HPI:    PAST MEDICAL & SURGICAL HISTORY:  Diabetes  Hypertension      FAMILY HISTORY:        Social Hx:  Nonsmoker, no drug or alcohol use    MEDICATIONS  (STANDING):  aspirin  chewable 81 milliGRAM(s) Oral daily  atorvastatin 40 milliGRAM(s) Oral at bedtime  enoxaparin Injectable 40 milliGRAM(s) SubCutaneous daily  insulin lispro (HumaLOG) corrective regimen sliding scale   SubCutaneous Before meals and at bedtime  losartan 50 milliGRAM(s) Oral daily  metoprolol tartrate 25 milliGRAM(s) Oral two times a day       Allergies    Allergy Status Unknown    Intolerances        ROS: Pertinent positives in HPI, all other ROS were reviewed and are negative.      Vital Signs Last 24 Hrs  T(C): 36.6 (23 May 2019 15:35), Max: 36.9 (23 May 2019 05:00)  T(F): 97.8 (23 May 2019 15:35), Max: 98.4 (23 May 2019 05:00)  HR: 78 (23 May 2019 17:51) (53 - 78)  BP: 104/77 (23 May 2019 17:51) (104/77 - 162/60)  BP(mean): --  RR: 18 (23 May 2019 15:35) (18 - 20)  SpO2: 93% (23 May 2019 15:35) (93% - 97%)        Constitutional: awake and alert.  HEENT: PERRLA, EOMI,   Neck: Supple.  Respiratory: Breath sounds are clear bilaterally  Cardiovascular: S1 and S2, regular / irregular rhythm  Gastrointestinal: soft, nontender  Extremities:  no edema  Vascular: Caritid Bruit - no  Musculoskeletal: no joint swelling/tenderness, no abnormal movements  Skin: No rashes    Neurological exam:  HF: A x O x 3. Appropriately interactive, normal affect. Speech fluent, No Aphasia or paraphasic errors. Naming /repetition intact   CN: IRVING, EOMI, VFF, facial sensation normal, no NLFD, tongue midline, Palate moves equally, SCM equal bilaterally  Motor: No pronator drift, Strength 5/5 in all 4 ext, normal bulk and tone, no tremor, rigidity or bradykinesia.    Sens: Intact to light touch / PP/ VS/ JS    Reflexes: Symmetric and normal . BJ 2+, BR 2+, KJ 2+, AJ 2+, downgoing toes b/l  Coord:  No FNFA, dysmetria, MARY intact   Gait/Balance: Normal/Cannot test    NIHSS:          Labs:                        15.2   7.51  )-----------( 147      ( 22 May 2019 07:38 )             49.1     05-22    144  |  107  |  24<H>  ----------------------------<  101<H>  4.4   |  30  |  1.09    Ca    8.8      22 May 2019 07:38  Phos  3.6     05-22  Mg     2.3     05-22 05-17 [id number] 8.0    05-17 Chol 226<H>  HDL 42 Trig 106      Radiology report:  - CT head:  - MRI brain  - MRA head/carotids  - EEG    A/P:    - No IV tpa given because…  - ASA/ PLavix  - Statin  - Dysphagia screen  - DVT prophylaxia  - MRI/A brain-carotids  - PT eval  - Speech/swallow eval    Total Critical Care Time spent:

## 2020-02-13 NOTE — PHYSICAL THERAPY INITIAL EVALUATION ADULT - THERAPY FREQUENCY, PT EVAL
Bedside shift change report given to Jun Garcia (oncoming nurse) by Peg Schwab M.,RN (offgoing nurse). Report included the following information SBAR, Kardex and MAR. 3-5x/week

## 2020-07-22 NOTE — ED PROVIDER NOTE - MDM PATIENT STATEMENT FOR ADDL TREATMENT
[FreeTextEntry1] : nasal congestion:\par - try saline spray or flonase\par \par cerumen\par - Cerumen removed in office today\par - F/U PRN Patient with one or more new problems requiring additional work-up/treatment.

## 2023-05-02 NOTE — PATIENT PROFILE ADULT - ARE SIGNIFICANT INDICATORS COMPLETE.
Pre-op Diagnosis: RIGHT MEDIAL MENISCUS TEAR    The above referenced H&P was reviewed by Rebecca Melchor MD on 5/2/2023, the patient was examined and no significant changes have occurred in the patient's condition since the H&P was performed. I discussed with the patient and/or legal representative the potential benefits, risks and side effects of this procedure; the likelihood of the patient achieving goals; and potential problems that might occur during recuperation. I discussed reasonable alternatives to the procedure, including risks, benefits and side effects related to the alternatives and risks related to not receiving this procedure. We will proceed with procedure as planned.
The H&P was reviewed by me. Patient was examined and no significant changes noted in patient's condition since H&P was performed. Discussed with patient and/or legal representative the potential benefits, risks and side effects of procedure; likelihood of patient achieving goals; and potential problems that might occur. Discussed reasonable alternatives to procedure, including risks, benefits and side effects related to the  alternatives and risks. We will proceed with procedure as planned.
Yes

## 2024-04-09 NOTE — PHYSICAL THERAPY INITIAL EVALUATION ADULT - MANUAL MUSCLE TESTING RESULTS, REHAB EVAL
BLE 3+/5 [Other:___] : [unfilled] [Patient] : Patient [[facility] Provider/Facility] : [facility] Provider/Facility [FreeTextEntry2] : As per therapist intake note: Pt has been experiencing intense symptoms of complicated PTSD, trouble focusing, and binge-eating to cope with difficult emotions and memories.   [FreeTextEntry1] : "I've been going through so much recently, I just don't know where to start."

## 2024-05-26 NOTE — PATIENT PROFILE ADULT - RESOURCE/ENVIRONMENTAL CONCERNS
Patient is ready for discharge. Patient stable alert and oriented. IV and telemetry removed. No complaints of pain. Discussed discharge plan. Reviewed medications and side effects, appointments, and answered questions with patient and family. Rx delivered to pt.     
none